# Patient Record
Sex: MALE | Race: WHITE | Employment: STUDENT | URBAN - METROPOLITAN AREA
[De-identification: names, ages, dates, MRNs, and addresses within clinical notes are randomized per-mention and may not be internally consistent; named-entity substitution may affect disease eponyms.]

---

## 2020-09-28 ENCOUNTER — APPOINTMENT (OUTPATIENT)
Dept: LAB | Facility: HOSPITAL | Age: 25
End: 2020-09-28
Attending: INTERNAL MEDICINE
Payer: COMMERCIAL

## 2020-09-28 DIAGNOSIS — R05.8 COUGH WITH EXPOSURE TO COVID-19 VIRUS: ICD-10-CM

## 2020-09-28 DIAGNOSIS — Z20.822 COUGH WITH EXPOSURE TO COVID-19 VIRUS: ICD-10-CM

## 2022-02-11 ENCOUNTER — ORDER TRANSCRIPTION (OUTPATIENT)
Dept: SLEEP CENTER | Age: 27
End: 2022-02-11

## 2022-07-29 ENCOUNTER — OFFICE VISIT (OUTPATIENT)
Dept: FAMILY MEDICINE CLINIC | Facility: CLINIC | Age: 27
End: 2022-07-29
Payer: COMMERCIAL

## 2022-07-29 VITALS
BODY MASS INDEX: 29.83 KG/M2 | OXYGEN SATURATION: 98 % | SYSTOLIC BLOOD PRESSURE: 108 MMHG | DIASTOLIC BLOOD PRESSURE: 59 MMHG | TEMPERATURE: 99 F | WEIGHT: 206 LBS | RESPIRATION RATE: 18 BRPM | HEIGHT: 69.6 IN | HEART RATE: 113 BPM

## 2022-07-29 DIAGNOSIS — J01.90 ACUTE VIRAL SINUSITIS: Primary | ICD-10-CM

## 2022-07-29 DIAGNOSIS — B97.89 ACUTE VIRAL SINUSITIS: Primary | ICD-10-CM

## 2022-07-29 PROCEDURE — 3008F BODY MASS INDEX DOCD: CPT | Performed by: NURSE PRACTITIONER

## 2022-07-29 PROCEDURE — 99202 OFFICE O/P NEW SF 15 MIN: CPT | Performed by: NURSE PRACTITIONER

## 2022-07-29 PROCEDURE — 3078F DIAST BP <80 MM HG: CPT | Performed by: NURSE PRACTITIONER

## 2022-07-29 PROCEDURE — 3074F SYST BP LT 130 MM HG: CPT | Performed by: NURSE PRACTITIONER

## 2022-07-29 RX ORDER — DEXTROAMPHETAMINE SACCHARATE, AMPHETAMINE ASPARTATE, DEXTROAMPHETAMINE SULFATE AND AMPHETAMINE SULFATE 5; 5; 5; 5 MG/1; MG/1; MG/1; MG/1
1 TABLET ORAL 2 TIMES DAILY
COMMUNITY
Start: 2022-07-13

## 2022-07-29 RX ORDER — BUDESONIDE 32 UG/1
2 SPRAY, METERED NASAL DAILY
COMMUNITY
Start: 2022-07-22

## 2022-07-29 RX ORDER — CETIRIZINE HYDROCHLORIDE 10 MG/1
1 TABLET ORAL AS DIRECTED
COMMUNITY

## 2022-07-29 RX ORDER — PREDNISONE 20 MG/1
40 TABLET ORAL DAILY
Qty: 10 TABLET | Refills: 0 | Status: SHIPPED | OUTPATIENT
Start: 2022-07-29 | End: 2022-08-03

## 2022-07-29 RX ORDER — ALPRAZOLAM 1 MG/1
1 TABLET ORAL 3 TIMES DAILY PRN
COMMUNITY
Start: 2022-07-13

## 2022-07-29 RX ORDER — AMOXICILLIN 875 MG/1
875 TABLET, COATED ORAL 2 TIMES DAILY
COMMUNITY
Start: 2022-07-22

## 2022-07-30 LAB — SARS-COV-2 RNA RESP QL NAA+PROBE: NOT DETECTED

## 2023-06-19 ENCOUNTER — HOSPITAL ENCOUNTER (EMERGENCY)
Facility: HOSPITAL | Age: 28
Discharge: HOME | End: 2023-06-19
Attending: EMERGENCY MEDICINE
Payer: COMMERCIAL

## 2023-06-19 ENCOUNTER — TELEPHONE (OUTPATIENT)
Dept: PAIN CLINIC | Facility: CLINIC | Age: 28
End: 2023-06-19

## 2023-06-19 VITALS
TEMPERATURE: 98 F | SYSTOLIC BLOOD PRESSURE: 131 MMHG | RESPIRATION RATE: 18 BRPM | WEIGHT: 205 LBS | DIASTOLIC BLOOD PRESSURE: 74 MMHG | HEART RATE: 80 BPM | BODY MASS INDEX: 31.07 KG/M2 | HEIGHT: 68 IN | OXYGEN SATURATION: 98 %

## 2023-06-19 DIAGNOSIS — M54.16 LUMBAR RADICULOPATHY: Primary | ICD-10-CM

## 2023-06-19 PROCEDURE — 99284 EMERGENCY DEPT VISIT MOD MDM: CPT

## 2023-06-19 PROCEDURE — 99283 EMERGENCY DEPT VISIT LOW MDM: CPT

## 2023-06-19 RX ORDER — CYCLOBENZAPRINE HCL 10 MG
10 TABLET ORAL 3 TIMES DAILY PRN
Qty: 20 TABLET | Refills: 0 | Status: SHIPPED | OUTPATIENT
Start: 2023-06-19 | End: 2023-06-26

## 2023-06-19 RX ORDER — GABAPENTIN 300 MG/1
300 CAPSULE ORAL 3 TIMES DAILY
Qty: 90 CAPSULE | Refills: 0 | Status: SHIPPED | OUTPATIENT
Start: 2023-06-19 | End: 2023-07-19

## 2023-06-19 NOTE — TELEPHONE ENCOUNTER
Received incoming [facility] seen by [facility] for herniated disc of lumbar spine. Referral and records have been placed in RN bin for MD review.

## 2023-06-19 NOTE — ED INITIAL ASSESSMENT (HPI)
Pt reports 8/10 back pain, pt reports he had a MRI a couple of day ago for the back pain and has bulging disc. Pt has an apointment to see a back in month but states the pain is to much.  Pt has cerebral palsy

## 2023-06-21 ENCOUNTER — OFFICE VISIT (OUTPATIENT)
Dept: PAIN CLINIC | Facility: HOSPITAL | Age: 28
End: 2023-06-21
Attending: ANESTHESIOLOGY
Payer: COMMERCIAL

## 2023-06-21 ENCOUNTER — TELEPHONE (OUTPATIENT)
Dept: PAIN CLINIC | Facility: HOSPITAL | Age: 28
End: 2023-06-21

## 2023-06-21 VITALS
HEART RATE: 86 BPM | DIASTOLIC BLOOD PRESSURE: 50 MMHG | RESPIRATION RATE: 18 BRPM | WEIGHT: 206 LBS | BODY MASS INDEX: 31.22 KG/M2 | HEIGHT: 68 IN | SYSTOLIC BLOOD PRESSURE: 95 MMHG

## 2023-06-21 DIAGNOSIS — M47.819 DEGENERATIVE ARTHROPATHY OF SPINAL FACET JOINT: ICD-10-CM

## 2023-06-21 DIAGNOSIS — M48.061 SPINAL STENOSIS OF LUMBAR REGION WITH RADICULOPATHY: ICD-10-CM

## 2023-06-21 DIAGNOSIS — M54.10 BACK PAIN WITH RIGHT-SIDED RADICULOPATHY: Primary | ICD-10-CM

## 2023-06-21 DIAGNOSIS — M51.36 DEGENERATIVE DISC DISEASE, LUMBAR: ICD-10-CM

## 2023-06-21 DIAGNOSIS — M54.16 SPINAL STENOSIS OF LUMBAR REGION WITH RADICULOPATHY: ICD-10-CM

## 2023-06-21 PROCEDURE — 99202 OFFICE O/P NEW SF 15 MIN: CPT

## 2023-06-21 NOTE — TELEPHONE ENCOUNTER
Scheduled procedure for: 6/27/23    Procedure follow-up for: 7/17/23 at 9 am.    Surgery Scheduling Form faxed to [facility] at [phone]

## 2023-06-21 NOTE — PROGRESS NOTES
6/21/2023-presents ambulatory tp CPM accompanied by family; Here to establish care; NEW CONSULT C/O LBP RADIATING DOWN THE RLE, CAUSING NUMBNESS DOWN THE RLE; pt is a golfer, states he woke the am after playing with the lbp, tightness unable to move; has been on gabapentin 300mg and cyclobenzaprine ; today his pain 3/10 but can go as high as an 8;  Referred by his PCP Dr. Albaro Porter; examined by Dr. Silke Guerra; reviewed records and MRI; refer to documentation for the plan of care.

## 2023-06-21 NOTE — TELEPHONE ENCOUNTER
Lumbar COURTNEY L2/3 - Cpt [id number] - Dx M54.16 - APPROVED    [facility] online, request above is authorized.    Authorization # [id number] effective date: 6/21/23 - 7/20/23

## 2023-06-22 ENCOUNTER — OFFICE VISIT (OUTPATIENT)
Dept: PHYSICAL MEDICINE AND REHAB | Facility: CLINIC | Age: 28
End: 2023-06-22
Payer: COMMERCIAL

## 2023-06-22 VITALS
OXYGEN SATURATION: 98 % | BODY MASS INDEX: 31.22 KG/M2 | HEART RATE: 86 BPM | DIASTOLIC BLOOD PRESSURE: 84 MMHG | HEIGHT: 68 IN | SYSTOLIC BLOOD PRESSURE: 120 MMHG | WEIGHT: 206 LBS

## 2023-06-22 DIAGNOSIS — M54.50 ACUTE RIGHT-SIDED LOW BACK PAIN WITHOUT SCIATICA: Primary | ICD-10-CM

## 2023-06-22 PROCEDURE — 3008F BODY MASS INDEX DOCD: CPT | Performed by: PHYSICAL MEDICINE & REHABILITATION

## 2023-06-22 PROCEDURE — 3079F DIAST BP 80-89 MM HG: CPT | Performed by: PHYSICAL MEDICINE & REHABILITATION

## 2023-06-22 PROCEDURE — 3074F SYST BP LT 130 MM HG: CPT | Performed by: PHYSICAL MEDICINE & REHABILITATION

## 2023-06-22 PROCEDURE — 99204 OFFICE O/P NEW MOD 45 MIN: CPT | Performed by: PHYSICAL MEDICINE & REHABILITATION

## 2023-06-22 NOTE — PATIENT INSTRUCTIONS
-Start physical therapy and home exercises  -Gabapentin three times daily  -Muscle relaxer as needed  -Injection as scheduled  -Follow up in 4 weeks

## 2023-06-27 ENCOUNTER — SURGERY CENTER DOCUMENTATION (OUTPATIENT)
Dept: SURGERY | Age: 28
End: 2023-06-27

## 2023-07-06 ENCOUNTER — MED REC SCAN ONLY (OUTPATIENT)
Dept: PHYSICAL MEDICINE AND REHAB | Facility: CLINIC | Age: 28
End: 2023-07-06

## 2023-07-17 ENCOUNTER — OFFICE VISIT (OUTPATIENT)
Dept: PAIN CLINIC | Facility: HOSPITAL | Age: 28
End: 2023-07-17
Attending: ANESTHESIOLOGY
Payer: COMMERCIAL

## 2023-07-17 VITALS — OXYGEN SATURATION: 98 % | HEART RATE: 108 BPM | SYSTOLIC BLOOD PRESSURE: 116 MMHG | DIASTOLIC BLOOD PRESSURE: 81 MMHG

## 2023-07-17 DIAGNOSIS — M47.819 DEGENERATIVE ARTHROPATHY OF SPINAL FACET JOINT: ICD-10-CM

## 2023-07-17 DIAGNOSIS — M54.10 BACK PAIN WITH RIGHT-SIDED RADICULOPATHY: Primary | ICD-10-CM

## 2023-07-17 DIAGNOSIS — M48.061 SPINAL STENOSIS OF LUMBAR REGION WITH RADICULOPATHY: ICD-10-CM

## 2023-07-17 DIAGNOSIS — M51.36 DEGENERATIVE DISC DISEASE, LUMBAR: ICD-10-CM

## 2023-07-17 DIAGNOSIS — M54.16 SPINAL STENOSIS OF LUMBAR REGION WITH RADICULOPATHY: ICD-10-CM

## 2023-07-17 PROCEDURE — 99211 OFF/OP EST MAY X REQ PHY/QHP: CPT

## 2023-07-17 NOTE — PROGRESS NOTES
PT presents ambulatory to the CPM.  Pt reports  he still has some pain in his right hip socket. 0/10 pain right now. Pain increases while sitting on a hard chair and when going from sit to stand. Dr. Demario Zuñiga saw PT for LESI L2/3 F/U. Refer to dictation for POC.

## 2023-07-17 NOTE — CHRONIC PAIN
Follow-up Note    HISTORY OF PRESENT ILLNESS:  Gerald Harp is a 25year old old male, originally referred to the pain clinic by Dr. Andria guajardo. provider found, returns to the clinic forBack pain with right-sided radiculopathy  (primary encounter diagnosis)  Degenerative disc disease, lumbar  Spinal stenosis of lumbar region with radiculopathy  Degenerative arthropathy of spinal facet joint  . Iman Welch returns the pain clinic for follow-up after undergoing a LESI at L2-3 on 6/27/2023 and now reports complete resolution of his pain symptoms. He continues in physical therapy he is stopped all of his pain medications. He is very happy with the outcome as he has returned back to golfing and his daily activities. Pt denies numbness/tingling/weakness. There is no incontinence of bowel/bladder. ALLERGIES:    Seasonal                ITCHING    Comment:Cockroach, trees, grass, dust mites    MEDICATION LIST:  Current Outpatient Medications   Medication Sig Dispense Refill    amphetamine-dextroamphetamine 20 MG Oral Tab Take 1 tablet by mouth 2 (two) times daily. FOR 30 DAYS      [employer] BUDESONIDE 32 MCG/ACT Nasal Suspension 2 sprays by Nasal route daily. ALPRAZolam 1 MG Oral Tab Take 1 tablet (1 mg total) by mouth 3 (three) times daily as needed. cetirizine (ZYRTEC ALLERGY) 10 MG Oral Tab Take 1 tablet (10 mg total) by mouth As Directed. REVIEW OF SYSTEMS:   Bowel/Bladder Incontinence: none  Coughing/sneezing/straining does not exacerbate the pain.   Numbness/tingling: as above  Weakness: as above  Weight Loss: Negative   Fever: Negative   Cardiovascular:  No current chest pain or palpitations   Respiratory:  No current shortness of breath   Gastrointestinal:  No active ulcer  Genitourinary:  Negative  Integumentary :  Negative  Psychiatric:  Negative  Hematologic: No active bleeding  Lymphatic: No current lymphedema  Allergic/Immunologic:  Negative  Musculoskeletal: As above  Neurological: As above  Denies chest pain, shortness of breath. MEDICAL HISTORY:  Patient Active Problem List:     Allergic rhinitis due to other allergen     Other chronic sinusitis     Hypertrophy of adenoids alone     DNS (deviated nasal septum)     Allergic rhinitis due to pollen    Past Medical History:   Diagnosis Date    Back pain     ANA (generalized anxiety disorder)        SURGICAL HISTORY:  No past surgical history on file. FAMILY HISTORY:  No family history on file. SOCIAL HISTORY:  Social History    Socioeconomic History      Marital status: Single      Spouse name: Not on file      Number of children: Not on file      Years of education: Not on file      Highest education level: Not on file    Occupational History      Not on file    Tobacco Use      Smoking status: Never      Smokeless tobacco: Never    Substance and Sexual Activity      Alcohol use: Yes        Comment: Weekend 1-2      Drug use: Never      Sexual activity: Not on file    Other Topics      Concerns:        Caffeine Concern: Yes          1 cup coffee daily        Exercise: Yes          Golfer        Seat Belt: Not Asked        Special Diet: Not Asked        Stress Concern: Not Asked        Weight Concern: Not Asked    Social History Narrative      The patient does not use an assistive device. .        The patient does live in a home with stairs.     Social Determinants of Health  Financial Resource Strain: Not on file  Food Insecurity: Not on file  Transportation Needs: Not on file  Physical Activity: Not on file  Stress: Not on file  Social Connections: Not on file  Housing Stability: Not on file  PHYSICAL EXAMINATION:   07/17/23  0856   BP: 116/81   Pulse: 108      General: Alert and oriented x3  Affect:  NAD  Eyes: anicteric; no injection  Gait: Normal;  cane user - No  Spine: Normal      IMAGING:  No new relevant studies     [place] PHYSICIAN MONITORING PROGRAM REVIEWED  No    ASSESSMENT:   Radha Flores is a 25year old  male, with Back pain with right-sided radiculopathy  (primary encounter diagnosis)  Degenerative disc disease, lumbar  Spinal stenosis of lumbar region with radiculopathy  Degenerative arthropathy of spinal facet joint       PLAN:  RECOMMENDATIONS:  1) continue physical therapy and if pain returns he may follow back up with the pain service. Comprehensive analgesic plan was formulated. Conservative vs. Aggressive measures were discussed at length including pharmacotherapy (eg. Anti- inflammatories, muscle relaxants, neuropathic medications, oral steroids, analgesics), injections, and further testing. Risks and benefits of all options were discussed at length to patients satisfaction during a comprehensive interactive discussion. All questions were answered during extended questions and answer session. Patient agreeable to discussion plan. Greater than 50% of the time was spent with counseling (nature of discussion centered around pain, therapy, and treatment options), face to face time, time spent reviewing data, obtaining patient information and discussing the care with the patients health care providers.      Pt will return to clinic as needed  Total time: 18 minutes    Aubree Ritchie MD  7/17/2023  Anesthesia Chronic Pain Service

## 2023-07-20 ENCOUNTER — MED REC SCAN ONLY (OUTPATIENT)
Dept: PAIN CLINIC | Facility: CLINIC | Age: 28
End: 2023-07-20

## 2023-07-31 ENCOUNTER — OFFICE VISIT (OUTPATIENT)
Dept: PHYSICAL MEDICINE AND REHAB | Facility: CLINIC | Age: 28
End: 2023-07-31
Payer: COMMERCIAL

## 2023-07-31 VITALS
DIASTOLIC BLOOD PRESSURE: 84 MMHG | SYSTOLIC BLOOD PRESSURE: 128 MMHG | WEIGHT: 206 LBS | BODY MASS INDEX: 31.22 KG/M2 | HEIGHT: 68 IN

## 2023-07-31 DIAGNOSIS — M54.16 RIGHT LUMBAR RADICULOPATHY: Primary | ICD-10-CM

## 2023-07-31 PROCEDURE — 99214 OFFICE O/P EST MOD 30 MIN: CPT | Performed by: PHYSICAL MEDICINE & REHABILITATION

## 2023-07-31 PROCEDURE — 3074F SYST BP LT 130 MM HG: CPT | Performed by: PHYSICAL MEDICINE & REHABILITATION

## 2023-07-31 PROCEDURE — 3008F BODY MASS INDEX DOCD: CPT | Performed by: PHYSICAL MEDICINE & REHABILITATION

## 2023-07-31 PROCEDURE — 3079F DIAST BP 80-89 MM HG: CPT | Performed by: PHYSICAL MEDICINE & REHABILITATION

## 2023-07-31 RX ORDER — TRAMADOL HYDROCHLORIDE 50 MG/1
50 TABLET ORAL EVERY 6 HOURS PRN
COMMUNITY
Start: 2023-06-16 | End: 2023-07-31 | Stop reason: ALTCHOICE

## 2023-08-01 ENCOUNTER — TELEPHONE (OUTPATIENT)
Dept: PHYSICAL MEDICINE AND REHAB | Facility: CLINIC | Age: 28
End: 2023-08-01

## 2023-08-01 NOTE — TELEPHONE ENCOUNTER
Provider Comments 07/31/2023  1:37 PM Yamileth Alberts DO Provider Comments -   Note:  Right L1 and L2 Transforaminal Epidural Steroid Injection under fluoroscopy guidance under local anesthesia  Referral Information    Referral # Creation Date Referral Status Status Update    [id number] 07/31/2023 Open 07/31/2023: Status History     Patient has been notified of status of injection.     Saved date on 8/7/23

## 2023-08-01 NOTE — TELEPHONE ENCOUNTER
Initiated authorization for Right L1 and L2 Transforaminal Epidural Steroid Injection under fluoroscopy CPT 13432, 11803 dx:M54.16 to be done at [facility] with [facility]  Status: Approved w/ order #[id number] valid 8/7/23-9/5/23

## 2023-08-01 NOTE — TELEPHONE ENCOUNTER
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

## 2023-08-02 NOTE — TELEPHONE ENCOUNTER
Patient has been scheduled for Right L1 and L2 Transforaminal Epidural Steroid Injection under fluoroscopy  on 8/7/23 at the [facility] with Dr. Sybil Hughes.   -Anesthesia type: Local.  -Patient informed to fast 12 hours prior to procedure with IVCS/MAC.   -Scheduling [address] covid testing required for all procedures whether patient is vaccinated or not. -Patient was advised that if he/she does receive the covid vaccine it needs to be at least 2 weeks before or after the injection. -Medications and allergies reviewed. -Patient reminded to hold NSAIDs (Ibuprofen, ASA 81, Aleve, Naproxen, Mobic, Diclofenac, Etodolac, Celebrex etc.) for 3 days prior to Lumbar MBB/Facet if BMI is greater than 35. For Cervical injections only hold multivitamins, Vitamin E, Fish Oil, Phentermine/Lomaira for 7 days prior to injection and NSAIDS.  mg to be held for 7 days prior to injections.  -If patient is receiving MAC/IVCS Phentermine Nohemy Isamar) will need to be held for 7 days prior to injection.  -If on blood thinner clearance has been received to hold this medication by provider.   -Patient informed he/she will need a  to and from procedure. Nathaly Iqbal is located in the [facility] [address] 1st floor,  may park in the yellow/purple parking lot. Patient verbalized understanding and agrees with plan.  -----> Scheduled in Epic: Yes  -----> Scheduled in Surgical Case:  Yes

## 2023-08-07 ENCOUNTER — OFFICE VISIT (OUTPATIENT)
Dept: SURGERY | Facility: CLINIC | Age: 28
End: 2023-08-07
Payer: COMMERCIAL

## 2023-08-07 DIAGNOSIS — M54.16 RIGHT LUMBAR RADICULOPATHY: Primary | ICD-10-CM

## 2023-08-07 NOTE — PROCEDURES
[address]    2-LEVEL LUMBAR TRANSFORAMINAL   NAME:  Alanna Braxton    MR #:    [id number] :  1995     PHYSICIAN:  Tone Coiln. Jeff Mckeon, DO        Operative Report    DATE OF PROCEDURE: 2023   PREOPERATIVE DIAGNOSES: 1. Right lumbar radiculopathy        POSTOPERATIVE DIAGNOSES:   1. Right lumbar radiculopathy        PROCEDURES: Right L1 and L2 transforaminal epidural steroid injections done under fluoroscopic guidance with contrast enhancement. SURGEON: Tone Colin. Jeff File, DO   ANESTHESIA: Local   INDICATIONS:      OPERATIVE PROCEDURE:  Written consent was obtained from the patient. The patient was brought into the operating room and placed in the prone position on the fluoroscopy table with pillow underneath the abdomen. The patient's skin was cleaned and draped in a normal sterile fashion. Using AP fluoroscopy, all five lumbar vertebrae were identified. When the first and second vertebrae were identified, fluoroscopy was left anterior obliqued opening up the right L1-2 and right L2-3 intervertebral foramen. At this point in time, each site was anesthetized with 1% PF lidocaine without epinephrine. Then, 3.5 inch, 22-gauge spinal needles were inserted and directed towards the right L1-2 and right L2-3 intervertebral foramen. When they felt to be in good position, AP fluoroscopy was used to advance the needles to the 6 o'clock position on the right L1 and right L2 pedicles. At this point in time, Omnipaque-240 contrast was used to obtain a good epidurogram indicating correct needle placement at each level. Then, aspiration was performed. No blood, fluid, or air was aspirated. Then, the patient was injected with a 3 cc solution of 1cc of 10 mg/cc of Dexamethasone and 2 cc of 1% PF lidocaine without epinephrine at each site. After this, the needles were removed. Each site was cleaned. Band-Aids were applied. The patient was transferred to the cart and into [facility].   The patient was given discharge instructions and will follow up in the clinic as scheduled. Throughout the whole procedure, the patient's pulse oximetry and vital signs were monitored and they remained completely stable. Also, throughout the whole procedure, prior to injection of any medication, aspiration was performed. No blood, fluid, or air was aspirated at anytime.     Janeth Duque DO, FAAPMR & CAQSM  Physical Medicine and Rehabilitation/Sports Medicine  MEDICAL CENTER [facility]

## 2023-08-14 ENCOUNTER — TELEPHONE (OUTPATIENT)
Dept: PHYSICAL MEDICINE AND REHAB | Facility: CLINIC | Age: 28
End: 2023-08-14

## 2023-08-14 NOTE — TELEPHONE ENCOUNTER
Condition update after Procedure. - Patient had Right L1 and L2 transforaminal epidural steroid injections  (specific procedure) on 08/07/2023 (date) with . - 0% relief. If pain is worse:  - Pain level prior to procedure:   7  - Current pain level:  7  - Pain location:  low back pain traveling down to right hip.   - Pain description: sharp/stabbing, continuous worst with movement. - Current pain treatment:  Motrin as needed (little help). - LOV: 7/31/2023 Shiraz Paul DO    - NOV: Visit date not found   - Plan from LOV: injection         Pt asking advise on what to do for the pain. Patient used to take gabapentin 300 mg x3 daily and stopped, asking if he can restart it. Pt asking if he should still do PT as he feels severe pain while PT. Pt wanted to ask what Dr Michael Bond recommends prior to scheduling a follow up.

## 2023-08-15 NOTE — TELEPHONE ENCOUNTER
Spoke with pt and educated on Dr Peyton Arteaga recommendations to wait up to two weeks for the full effect of the injection. Pt verbalized understanding. No further actions needed at this time. Pt stated he will call after two weeks. Closing the encounter.

## 2023-09-14 ENCOUNTER — PATIENT MESSAGE (OUTPATIENT)
Dept: PHYSICAL MEDICINE AND REHAB | Facility: CLINIC | Age: 28
End: 2023-09-14

## 2023-10-10 ENCOUNTER — APPOINTMENT (OUTPATIENT)
Dept: GENERAL RADIOLOGY | Age: 28
End: 2023-10-10
Attending: ORTHOPAEDIC SURGERY

## 2023-10-17 ENCOUNTER — HOSPITAL ENCOUNTER (OUTPATIENT)
Dept: GENERAL RADIOLOGY | Age: 28
Discharge: HOME | End: 2023-10-17
Attending: ORTHOPAEDIC SURGERY

## 2023-10-17 DIAGNOSIS — M54.16 LUMBAR RADICULOPATHY: ICD-10-CM

## 2023-10-17 PROCEDURE — 10002800 HB RX 250 W HCPCS: Performed by: SPECIALIST

## 2023-10-17 PROCEDURE — 10002805 HB CONTRAST AGENT: Performed by: SPECIALIST

## 2023-10-17 PROCEDURE — 64483 NJX AA&/STRD TFRM EPI L/S 1: CPT

## 2023-10-17 RX ORDER — TRIAMCINOLONE ACETONIDE 40 MG/ML
80 INJECTION, SUSPENSION INTRA-ARTICULAR; INTRAMUSCULAR ONCE
Status: COMPLETED | OUTPATIENT
Start: 2023-10-17 | End: 2023-10-17

## 2023-10-17 RX ADMIN — IOHEXOL 10 ML: 300 INJECTION, SOLUTION INTRAVENOUS at 13:37

## 2023-10-17 RX ADMIN — TRIAMCINOLONE ACETONIDE 80 MG: 40 INJECTION, SUSPENSION INTRA-ARTICULAR; INTRAMUSCULAR at 13:36

## 2024-11-10 ENCOUNTER — HOSPITAL ENCOUNTER (EMERGENCY)
Facility: HOSPITAL | Age: 29
Discharge: HOME | End: 2024-11-10
Attending: EMERGENCY MEDICINE
Payer: COMMERCIAL

## 2024-11-10 VITALS
HEIGHT: 68 IN | HEART RATE: 96 BPM | RESPIRATION RATE: 18 BRPM | WEIGHT: 205 LBS | BODY MASS INDEX: 31.07 KG/M2 | DIASTOLIC BLOOD PRESSURE: 84 MMHG | TEMPERATURE: 97 F | SYSTOLIC BLOOD PRESSURE: 131 MMHG | OXYGEN SATURATION: 96 %

## 2024-11-10 DIAGNOSIS — M62.838 SPASM OF MUSCLE: Primary | ICD-10-CM

## 2024-11-10 PROCEDURE — 96372 THER/PROPH/DIAG INJ SC/IM: CPT

## 2024-11-10 PROCEDURE — 99283 EMERGENCY DEPT VISIT LOW MDM: CPT

## 2024-11-10 RX ORDER — DIAZEPAM 5 MG/1
5 TABLET ORAL 3 TIMES DAILY PRN
Qty: 15 TABLET | Refills: 0 | Status: SHIPPED | OUTPATIENT
Start: 2024-11-10 | End: 2024-11-17

## 2024-11-10 RX ORDER — KETOROLAC TROMETHAMINE 30 MG/ML
60 INJECTION, SOLUTION INTRAMUSCULAR; INTRAVENOUS ONCE
Status: COMPLETED | OUTPATIENT
Start: 2024-11-10 | End: 2024-11-10

## 2024-11-10 NOTE — ED QUICK NOTES
Pt cleared by MD for discharge. Belongings with patient. Patient instructions reviewed . Pt A&ox4. Pt walking with steady gait.

## 2024-11-10 NOTE — ED INITIAL ASSESSMENT (HPI)
Pt c/o pain in right shoulder, pain with turning head. Had massage yesterday which helped. Tingling in arm- hx neuropathy.       Hx Spinal Fusion Nov 23.

## 2024-11-10 NOTE — ED PROVIDER NOTES
Patient Seen in: [facility] Emergency Department      History     Chief Complaint   Patient presents with    Pain     Stated Complaint: Chronic Shoulder/Neck Pain; Neuropathy    Subjective:   HPI      Patient is a 29-year-old male with a history of CP neuropathy muscle spasm and spasticity who complains of pain and tightness to his right trapezius area.  He states yesterday he was in [place] had a massage which helped him a great deal but shortly thereafter and then worsening this morning that pain and spasms returned.  He states at times it radiates down his right arm.  He has some intermittent tingling to his right third finger.  No new injury or fall.    Patient states he has a prescription for Flexeril that makes him too somnolent he does not like to take it    Objective:     Past Medical History:    Anxiety    Back pain    ANA (generalized anxiety disorder)              History reviewed. No pertinent surgical history.             Social History     Socioeconomic History    Marital status: Single   Tobacco Use    Smoking status: Never    Smokeless tobacco: Never   Substance and Sexual Activity    Alcohol use: Yes     Comment: Occasional Beer once a week    Drug use: Never   Other Topics Concern    Caffeine Concern Yes     Comment: 1 cup coffee daily    Exercise Yes     Comment: Iman   Social History Narrative    The patient does not use an assistive device..      The patient does live in a home with stairs.     Social Drivers of Health      Received from [facility]    Housing Stability                  Physical Exam     ED Triage Vitals [11/10/24 1527]   /89   Pulse 87   Resp 18   Temp 97.1 °F (36.2 °C)   Temp src    SpO2 100 %   O2 Device        Current Vitals:   Vital Signs  BP: 153/89  Pulse: 87  Resp: 18  Temp: 97.1 °F (36.2 °C)    Oxygen Therapy  SpO2: 100 %        Physical Exam  Constitutional: Oriented to person, place, and time. Appears well-developed and  well-nourished.   HEENT: PERRLA  Head: Normocephalic and atraumatic.   Right Ear: External ear normal.   Left Ear: External ear normal.   Nose: Nose normal.   Mouth/Throat: Oropharynx is clear and moist.   Eyes: Conjunctivae and EOM are normal. Pupils are equal, round, and reactive to light.   Neck: Right trapezius tenderness and muscle spasm  Cardiovascular: Normal rate, regular rhythm, normal heart sounds and intact distal pulses.    Pulmonary/Chest: Effort normal and breath sounds normal. No respiratory distress.   Abdominal: Soft. Bowel sounds are normal. Exhibits no distension and no mass. There is no tenderness. There is no rebound and no guarding.   Musculoskeletal: Normal range of motion. Exhibits no edema or tenderness.   Lymphadenopathy: No cervical adenopathy.   Neurological: Alert and oriented to person, place, and time. Normal reflexes. No cranial nerve deficit. No motor os sensory defecits noted Coordination normal.   Skin: Skin is warm and dry.   Psychiatric: Normal mood and affect. Behavior is normal. Judgment and thought content normal.   Nursing note and vitals reviewed.      ED Course   Labs Reviewed - No data to display                MDM      Use of independent historian:     I personally reviewed and interpreted the images :     No results found.    Vitals:    11/10/24 1527   BP: 153/89   Pulse: 87   Resp: 18   Temp: 97.1 °F (36.2 °C)   SpO2: 100%   Weight: 93 kg   Height: 172.7 cm (5' 8\")     *I personally reviewed and interpreted all ED vitals.    Pulse Ox: 100%, Room air, Normal       Differential Diagnosis/ Diagnostic Considerations: 29-year-old male history of CP and spasticity now with muscle spasms right trapezius area worsened after massage yesterday.  Will give Toradol and prescribe low-dose Valium as muscle relaxant recommend heat.  No neurologic deficits appreciated on exam    Medical Record Review: I personally reviewed available prior medical records for any recent pertinent  discharge summaries, testing, and procedures and reviewed those reports and found evaluation with [facility] 2/27/2024 history of lumbar fusion notes reviewed.    Complicating Factors: The patient already has CP spasticity lumbar which contribute to the complexity of this ED evaluation.    Social determinants of health:    Prescription drug management:      Shared Decision Making:    ED Course: Plans patient did receive Toradol with some relief.  Will discharge home with short course of Valium he will hold his Xanax    Discussion of management with other healthcare providers:    Condition upon leaving the department: Stable          Medical Decision Making      Disposition and Plan     Clinical Impression:  1. Spasm of muscle         Disposition:  Discharge  11/10/2024  4:31 pm    Follow-up:  Lea Dorantes DO  [facility] HAVE[address] [id number] [phone]    Follow up in 2 day(s)            Medications Prescribed:  Current Discharge Medication List        START taking these medications    Details   diazePAM 5 MG Oral Tab Take 1 tablet (5 mg total) by mouth 3 (three) times daily as needed (muscle spasm).  Qty: 15 tablet, Refills: 0    Associated Diagnoses: Spasm of muscle                 Supplementary Documentation:

## 2024-12-17 ENCOUNTER — EKG ENCOUNTER (OUTPATIENT)
Dept: LAB | Facility: HOSPITAL | Age: 29
End: 2024-12-17
Attending: INTERNAL MEDICINE
Payer: COMMERCIAL

## 2024-12-17 ENCOUNTER — LAB ENCOUNTER (OUTPATIENT)
Dept: LAB | Facility: REFERENCE LAB | Age: 29
End: 2024-12-17
Attending: INTERNAL MEDICINE
Payer: COMMERCIAL

## 2024-12-17 DIAGNOSIS — Z01.818 PREOP EXAMINATION: ICD-10-CM

## 2024-12-17 DIAGNOSIS — M51.369 DEGENERATIVE LUMBAR DISC: ICD-10-CM

## 2024-12-17 DIAGNOSIS — Z01.818 PREOP EXAMINATION: Primary | ICD-10-CM

## 2024-12-17 LAB
ALBUMIN SERPL-MCNC: 4.9 G/DL (ref 3.2–4.8)
ALBUMIN/GLOB SERPL: 1.8 {RATIO} (ref 1–2)
ALP LIVER SERPL-CCNC: 74 U/L
ALT SERPL-CCNC: 38 U/L
ANION GAP SERPL CALC-SCNC: 8 MMOL/L (ref 0–18)
AST SERPL-CCNC: 28 U/L (ref ?–34)
ATRIAL RATE: 103 BPM
BASOPHILS # BLD AUTO: 0.06 X10(3) UL (ref 0–0.2)
BASOPHILS NFR BLD AUTO: 0.7 %
BILIRUB SERPL-MCNC: 0.5 MG/DL (ref 0.3–1.2)
BUN BLD-MCNC: 14 MG/DL (ref 9–23)
BUN/CREAT SERPL: 12.7 (ref 10–20)
CALCIUM BLD-MCNC: 10 MG/DL (ref 8.7–10.4)
CHLORIDE SERPL-SCNC: 102 MMOL/L (ref 98–112)
CO2 SERPL-SCNC: 29 MMOL/L (ref 21–32)
CREAT BLD-MCNC: 1.1 MG/DL
DEPRECATED RDW RBC AUTO: 41.5 FL (ref 35.1–46.3)
EGFRCR SERPLBLD CKD-EPI 2021: 92 ML/MIN/1.73M2 (ref 60–?)
EOSINOPHIL # BLD AUTO: 0.21 X10(3) UL (ref 0–0.7)
EOSINOPHIL NFR BLD AUTO: 2.5 %
ERYTHROCYTE [DISTWIDTH] IN BLOOD BY AUTOMATED COUNT: 12.1 % (ref 11–15)
FASTING STATUS PATIENT QL REPORTED: YES
GLOBULIN PLAS-MCNC: 2.7 G/DL (ref 2–3.5)
GLUCOSE BLD-MCNC: 115 MG/DL (ref 70–99)
HCT VFR BLD AUTO: 44.3 %
HGB BLD-MCNC: 15.2 G/DL
IMM GRANULOCYTES # BLD AUTO: 0.02 X10(3) UL (ref 0–1)
IMM GRANULOCYTES NFR BLD: 0.2 %
LYMPHOCYTES # BLD AUTO: 1.77 X10(3) UL (ref 1–4)
LYMPHOCYTES NFR BLD AUTO: 20.8 %
MCH RBC QN AUTO: 31.9 PG (ref 26–34)
MCHC RBC AUTO-ENTMCNC: 34.3 G/DL (ref 31–37)
MCV RBC AUTO: 93.1 FL
MONOCYTES # BLD AUTO: 0.78 X10(3) UL (ref 0.1–1)
MONOCYTES NFR BLD AUTO: 9.2 %
NEUTROPHILS # BLD AUTO: 5.68 X10 (3) UL (ref 1.5–7.7)
NEUTROPHILS # BLD AUTO: 5.68 X10(3) UL (ref 1.5–7.7)
NEUTROPHILS NFR BLD AUTO: 66.6 %
OSMOLALITY SERPL CALC.SUM OF ELEC: 289 MOSM/KG (ref 275–295)
P AXIS: 67 DEGREES
P-R INTERVAL: 142 MS
PLATELET # BLD AUTO: 259 10(3)UL (ref 150–450)
POTASSIUM SERPL-SCNC: 4.5 MMOL/L (ref 3.5–5.1)
PROT SERPL-MCNC: 7.6 G/DL (ref 5.7–8.2)
Q-T INTERVAL: 324 MS
QRS DURATION: 90 MS
QTC CALCULATION (BEZET): 424 MS
R AXIS: 13 DEGREES
RBC # BLD AUTO: 4.76 X10(6)UL
SODIUM SERPL-SCNC: 139 MMOL/L (ref 136–145)
T AXIS: 9 DEGREES
VENTRICULAR RATE: 103 BPM
WBC # BLD AUTO: 8.5 X10(3) UL (ref 4–11)

## 2024-12-17 PROCEDURE — 36415 COLL VENOUS BLD VENIPUNCTURE: CPT

## 2024-12-17 PROCEDURE — 93010 ELECTROCARDIOGRAM REPORT: CPT | Performed by: INTERNAL MEDICINE

## 2024-12-17 PROCEDURE — 80053 COMPREHEN METABOLIC PANEL: CPT

## 2024-12-17 PROCEDURE — 93005 ELECTROCARDIOGRAM TRACING: CPT

## 2024-12-17 PROCEDURE — 85025 COMPLETE CBC W/AUTO DIFF WBC: CPT

## 2024-12-30 VITALS — HEIGHT: 68 IN | BODY MASS INDEX: 30.31 KG/M2 | WEIGHT: 200 LBS

## 2024-12-30 RX ORDER — SODIUM CHLORIDE, SODIUM LACTATE, POTASSIUM CHLORIDE, CALCIUM CHLORIDE 600; 310; 30; 20 MG/100ML; MG/100ML; MG/100ML; MG/100ML
INJECTION, SOLUTION INTRAVENOUS CONTINUOUS
Status: CANCELLED | OUTPATIENT
Start: 2024-12-30

## 2024-12-30 RX ORDER — MULTIVITAMIN
1 TABLET ORAL DAILY
COMMUNITY
End: 2025-01-07

## 2024-12-30 RX ORDER — METOCLOPRAMIDE HYDROCHLORIDE 5 MG/ML
10 INJECTION INTRAMUSCULAR; INTRAVENOUS ONCE
Status: CANCELLED | OUTPATIENT
Start: 2024-12-30

## 2024-12-30 RX ORDER — FAMOTIDINE 10 MG/ML
20 INJECTION, SOLUTION INTRAVENOUS ONCE
Status: CANCELLED | OUTPATIENT
Start: 2024-12-30

## 2024-12-30 RX ORDER — FAMOTIDINE 20 MG/1
20 TABLET, FILM COATED ORAL ONCE
Status: CANCELLED | OUTPATIENT
Start: 2024-12-30 | End: 2024-12-30

## 2024-12-30 RX ORDER — CYCLOBENZAPRINE HCL 10 MG
10 TABLET ORAL 3 TIMES DAILY
COMMUNITY

## 2024-12-30 RX ORDER — METOCLOPRAMIDE 10 MG/1
10 TABLET ORAL ONCE
Status: CANCELLED | OUTPATIENT
Start: 2024-12-30 | End: 2024-12-30

## 2025-01-07 ENCOUNTER — HOSPITAL ENCOUNTER (OUTPATIENT)
Dept: MRI IMAGING | Facility: HOSPITAL | Age: 30
Discharge: HOME | End: 2025-01-07
Attending: ORTHOPAEDIC SURGERY
Payer: COMMERCIAL

## 2025-01-07 ENCOUNTER — ANESTHESIA EVENT (OUTPATIENT)
Dept: ADMINISTRATIVE | Facility: HOSPITAL | Age: 30
End: 2025-01-07
Payer: COMMERCIAL

## 2025-01-07 ENCOUNTER — HOSPITAL ENCOUNTER (OUTPATIENT)
Dept: MRI IMAGING | Facility: HOSPITAL | Age: 30
Discharge: HOME | End: 2025-01-07
Attending: NURSE PRACTITIONER
Payer: COMMERCIAL

## 2025-01-07 ENCOUNTER — HOSPITAL ENCOUNTER (OUTPATIENT)
Dept: MRI IMAGING | Facility: HOSPITAL | Age: 30
End: 2025-01-07
Attending: NURSE PRACTITIONER
Payer: COMMERCIAL

## 2025-01-07 ENCOUNTER — ANESTHESIA (OUTPATIENT)
Dept: MRI IMAGING | Facility: HOSPITAL | Age: 30
End: 2025-01-07

## 2025-01-07 ENCOUNTER — ANESTHESIA (OUTPATIENT)
Dept: ADMINISTRATIVE | Facility: HOSPITAL | Age: 30
End: 2025-01-07
Payer: COMMERCIAL

## 2025-01-07 ENCOUNTER — ANESTHESIA EVENT (OUTPATIENT)
Dept: MRI IMAGING | Facility: HOSPITAL | Age: 30
End: 2025-01-07

## 2025-01-07 VITALS
RESPIRATION RATE: 16 BRPM | BODY MASS INDEX: 32.49 KG/M2 | WEIGHT: 207 LBS | SYSTOLIC BLOOD PRESSURE: 140 MMHG | DIASTOLIC BLOOD PRESSURE: 96 MMHG | HEIGHT: 67 IN | TEMPERATURE: 98 F | OXYGEN SATURATION: 94 % | HEART RATE: 68 BPM

## 2025-01-07 DIAGNOSIS — M54.12 CERVICAL RADICULOPATHY: ICD-10-CM

## 2025-01-07 PROCEDURE — BR30ZZZ MAGNETIC RESONANCE IMAGING (MRI) OF CERVICAL SPINE: ICD-10-PCS | Performed by: NURSE PRACTITIONER

## 2025-01-07 PROCEDURE — 72141 MRI NECK SPINE W/O DYE: CPT | Performed by: NURSE PRACTITIONER

## 2025-01-07 RX ORDER — MIDAZOLAM HYDROCHLORIDE 1 MG/ML
INJECTION INTRAMUSCULAR; INTRAVENOUS AS NEEDED
Status: DISCONTINUED | OUTPATIENT
Start: 2025-01-07 | End: 2025-01-07 | Stop reason: SURG

## 2025-01-07 RX ORDER — HYDROMORPHONE HYDROCHLORIDE 1 MG/ML
0.6 INJECTION, SOLUTION INTRAMUSCULAR; INTRAVENOUS; SUBCUTANEOUS EVERY 5 MIN PRN
Status: DISCONTINUED | OUTPATIENT
Start: 2025-01-07 | End: 2025-01-09

## 2025-01-07 RX ORDER — NALOXONE HYDROCHLORIDE 0.4 MG/ML
0.08 INJECTION, SOLUTION INTRAMUSCULAR; INTRAVENOUS; SUBCUTANEOUS AS NEEDED
Status: ACTIVE | OUTPATIENT
Start: 2025-01-07 | End: 2025-01-07

## 2025-01-07 RX ORDER — HYDROMORPHONE HYDROCHLORIDE 1 MG/ML
0.2 INJECTION, SOLUTION INTRAMUSCULAR; INTRAVENOUS; SUBCUTANEOUS EVERY 5 MIN PRN
Status: DISCONTINUED | OUTPATIENT
Start: 2025-01-07 | End: 2025-01-09

## 2025-01-07 RX ORDER — MORPHINE SULFATE 4 MG/ML
4 INJECTION, SOLUTION INTRAMUSCULAR; INTRAVENOUS EVERY 10 MIN PRN
Status: DISCONTINUED | OUTPATIENT
Start: 2025-01-07 | End: 2025-01-09

## 2025-01-07 RX ORDER — MORPHINE SULFATE 10 MG/ML
6 INJECTION, SOLUTION INTRAMUSCULAR; INTRAVENOUS EVERY 10 MIN PRN
Status: DISCONTINUED | OUTPATIENT
Start: 2025-01-07 | End: 2025-01-09

## 2025-01-07 RX ORDER — DEXAMETHASONE SODIUM PHOSPHATE 4 MG/ML
VIAL (ML) INJECTION AS NEEDED
Status: DISCONTINUED | OUTPATIENT
Start: 2025-01-07 | End: 2025-01-07 | Stop reason: SURG

## 2025-01-07 RX ORDER — METOCLOPRAMIDE HYDROCHLORIDE 5 MG/ML
10 INJECTION INTRAMUSCULAR; INTRAVENOUS ONCE
Status: COMPLETED | OUTPATIENT
Start: 2025-01-07 | End: 2025-01-07

## 2025-01-07 RX ORDER — FAMOTIDINE 20 MG/1
20 TABLET, FILM COATED ORAL ONCE
Status: COMPLETED | OUTPATIENT
Start: 2025-01-07 | End: 2025-01-07

## 2025-01-07 RX ORDER — SODIUM CHLORIDE, SODIUM LACTATE, POTASSIUM CHLORIDE, CALCIUM CHLORIDE 600; 310; 30; 20 MG/100ML; MG/100ML; MG/100ML; MG/100ML
INJECTION, SOLUTION INTRAVENOUS CONTINUOUS
Status: DISCONTINUED | OUTPATIENT
Start: 2025-01-07 | End: 2025-01-09

## 2025-01-07 RX ORDER — ONDANSETRON 2 MG/ML
4 INJECTION INTRAMUSCULAR; INTRAVENOUS EVERY 6 HOURS PRN
Status: DISCONTINUED | OUTPATIENT
Start: 2025-01-07 | End: 2025-01-09

## 2025-01-07 RX ORDER — LIDOCAINE HYDROCHLORIDE 10 MG/ML
INJECTION, SOLUTION EPIDURAL; INFILTRATION; INTRACAUDAL; PERINEURAL AS NEEDED
Status: DISCONTINUED | OUTPATIENT
Start: 2025-01-07 | End: 2025-01-07 | Stop reason: SURG

## 2025-01-07 RX ORDER — METOCLOPRAMIDE 10 MG/1
10 TABLET ORAL ONCE
Status: COMPLETED | OUTPATIENT
Start: 2025-01-07 | End: 2025-01-07

## 2025-01-07 RX ORDER — FAMOTIDINE 10 MG/ML
20 INJECTION, SOLUTION INTRAVENOUS ONCE
Status: COMPLETED | OUTPATIENT
Start: 2025-01-07 | End: 2025-01-07

## 2025-01-07 RX ORDER — ONDANSETRON 2 MG/ML
INJECTION INTRAMUSCULAR; INTRAVENOUS AS NEEDED
Status: DISCONTINUED | OUTPATIENT
Start: 2025-01-07 | End: 2025-01-07 | Stop reason: SURG

## 2025-01-07 RX ORDER — PROCHLORPERAZINE EDISYLATE 5 MG/ML
5 INJECTION INTRAMUSCULAR; INTRAVENOUS EVERY 8 HOURS PRN
Status: DISCONTINUED | OUTPATIENT
Start: 2025-01-07 | End: 2025-01-09

## 2025-01-07 RX ORDER — HYDROMORPHONE HYDROCHLORIDE 1 MG/ML
0.4 INJECTION, SOLUTION INTRAMUSCULAR; INTRAVENOUS; SUBCUTANEOUS EVERY 5 MIN PRN
Status: DISCONTINUED | OUTPATIENT
Start: 2025-01-07 | End: 2025-01-09

## 2025-01-07 RX ORDER — MORPHINE SULFATE 4 MG/ML
2 INJECTION, SOLUTION INTRAMUSCULAR; INTRAVENOUS EVERY 10 MIN PRN
Status: DISCONTINUED | OUTPATIENT
Start: 2025-01-07 | End: 2025-01-09

## 2025-01-07 RX ORDER — MIDAZOLAM HYDROCHLORIDE 1 MG/ML
INJECTION INTRAMUSCULAR; INTRAVENOUS
Status: DISCONTINUED
Start: 2025-01-07 | End: 2025-01-08

## 2025-01-07 RX ADMIN — LIDOCAINE HYDROCHLORIDE 50 MG: 10 INJECTION, SOLUTION EPIDURAL; INFILTRATION; INTRACAUDAL; PERINEURAL at 14:59:00

## 2025-01-07 RX ADMIN — ONDANSETRON 4 MG: 2 INJECTION INTRAMUSCULAR; INTRAVENOUS at 15:04:00

## 2025-01-07 RX ADMIN — MIDAZOLAM HYDROCHLORIDE 2 MG: 1 INJECTION INTRAMUSCULAR; INTRAVENOUS at 14:45:00

## 2025-01-07 RX ADMIN — SODIUM CHLORIDE, SODIUM LACTATE, POTASSIUM CHLORIDE, CALCIUM CHLORIDE: 600; 310; 30; 20 INJECTION, SOLUTION INTRAVENOUS at 14:36:00

## 2025-01-07 RX ADMIN — MIDAZOLAM HYDROCHLORIDE 2 MG: 1 INJECTION INTRAMUSCULAR; INTRAVENOUS at 14:53:00

## 2025-01-07 RX ADMIN — METOCLOPRAMIDE 10 MG: 10 TABLET ORAL at 14:25:00

## 2025-01-07 RX ADMIN — SODIUM CHLORIDE, SODIUM LACTATE, POTASSIUM CHLORIDE, CALCIUM CHLORIDE: 600; 310; 30; 20 INJECTION, SOLUTION INTRAVENOUS at 14:44:00

## 2025-01-07 RX ADMIN — FAMOTIDINE 20 MG: 20 TABLET, FILM COATED ORAL at 14:25:00

## 2025-01-07 RX ADMIN — DEXAMETHASONE SODIUM PHOSPHATE 4 MG: 4 MG/ML VIAL (ML) INJECTION at 14:59:00

## 2025-01-07 NOTE — DISCHARGE INSTRUCTIONS
HOME INSTRUCTIONS  [facility] HOME CARE INSTRUCTIONS: POST-OP ANESTHESIA  The medication that you received for sedation or general anesthesia can last up to 24 hours. Your judgment and reflexes may be altered, even if you feel like your normal self.      We Recommend:   Do not drive any motor vehicle or bicycle   Avoid mowing the lawn, playing sports, or working with power tools/applicances (power saws, electric knives or mixers)   That you have someone stay with you on your first night home   Do not drink alcohol or take sleeping pills or tranquilizers   Do not sign legal documents within 24 hours of your procedure   If you had a nerve block for your surgery, take extra care not to put any pressure on your arm or hand for 24 hours    It is normal:  For you to have a sore throat if you had a breathing tube during surgery (while you were asleep!). The sore throat should get better within 48 hours. You can gargle with warm salt water (1/2 tsp in 4 oz warm water) or use a throat lozenge for comfort  To feel muscle aches or soreness especially in the abdomen, chest or neck. The achy feeling should go away in the next 24 hours  To feel weak, sleepy or \"wiped out\". Your should start feeling better in the next 24 hours.   To experience mild discomforts such as sore lip or tongue, headache, cramps, gas pains or a bloated feeling in your abdomen.   To experience mild back pain or soreness for a day or two if you had spinal or epidural anesthesia.   If you had laparoscopic surgery, to feel shoulder pain or discomfort on the day of surgery.   For some patients to have nausea after surgery/anesthesia    If you feel nausea or experience vomiting:   Try to move around less.   Eat less than usual or drink only liquids until the next morning   Nausea should resolve in about 24 hours    If you have a problem when you are at home:    Call your surgeons office

## 2025-01-07 NOTE — ANESTHESIA POSTPROCEDURE EVALUATION
Patient: Cesar Llanos    Procedure Summary       Date: 01/07/25 Room / Location: [facility] MRI; [facility] Post Anesthesia Care Unit    Anesthesia Start: 1443 Anesthesia Stop: 1542    Procedure: MRI SPINE CERVICAL (CPT=72141) Diagnosis: Cervical radiculopathy    Scheduled Providers: Anuradha Perez MD Anesthesiologist: Anuradha Perez MD    Anesthesia Type: MAC ASA Status: 3            Anesthesia Type: MAC    Vitals Value Taken Time   /68 01/07/25 1536   Temp 98.1 °F (36.7 °C) 01/07/25 1536   Pulse 65 01/07/25 1542   Resp 14 01/07/25 1542   SpO2 95 % 01/07/25 1542   Vitals shown include unfiled device data.    [facility] AN Post Evaluation:   Patient Evaluated in PACU  Patient Participation: complete - patient participated  Level of Consciousness: awake and alert  Pain Score: 0  Pain Management: adequate  Airway Patency:patent  Dental exam unchanged from preop  Yes    Nausea/Vomiting: none  Cardiovascular Status: acceptable  Respiratory Status: acceptable  Postoperative Hydration acceptable      Anuradha Perez MD  1/7/2025 3:43 PM

## 2025-01-07 NOTE — ANESTHESIA PREPROCEDURE EVALUATION
Anesthesia PreOp Note    HPI:     Cesar Llanos is a 29 year old male who presents for preoperative consultation requested by: * No surgeons listed *    Date of Surgery: 1/7/2025    * No procedures listed *  Indication: * No pre-op diagnosis entered *    Relevant Problems   No relevant active problems       NPO:  Last Liquid Consumption Date: 01/06/25     Last Solid Consumption Date: 01/06/25     Last Liquid Consumption Date: 01/06/25          History Review:  Patient Active Problem List    Diagnosis Date Noted    Allergic rhinitis due to pollen 09/27/2010    Allergic rhinitis due to other allergen 12/30/2009    Other chronic sinusitis 12/30/2009    Hypertrophy of adenoids alone 12/30/2009    DNS (deviated nasal septum) 12/30/2009       Past Medical History:    Anxiety    Back pain    Back problem    CP (cerebral palsy) (HCC)    ANA (generalized anxiety disorder)    Neuropathy    PONV (postoperative nausea and vomiting)       Past Surgical History:   Procedure Laterality Date    Adenoidectomy      Appendectomy      Spine surgery procedure unlisted  11/22/2024    lumbar       Prescriptions Prior to Admission[1]  Current Medications and Prescriptions Ordered in Epic[2]    Allergies[3]    Family History   Problem Relation Age of Onset    Diabetes Mother      Social History     Socioeconomic History    Marital status: Single   Tobacco Use    Smoking status: Never    Smokeless tobacco: Never   Vaping Use    Vaping status: Never Used   Substance and Sexual Activity    Alcohol use: Yes     Comment: 2 x weekly    Drug use: Never   Other Topics Concern    Caffeine Concern Yes     Comment: 1 cup coffee daily    Exercise Yes     Comment: Golfer       Available pre-op labs reviewed.  Lab Results   Component Value Date    WBC 8.5 12/17/2024    RBC 4.76 12/17/2024    HGB 15.2 12/17/2024    HCT 44.3 12/17/2024    MCV 93.1 12/17/2024    MCH 31.9 12/17/2024    MCHC 34.3 12/17/2024    RDW 12.1 12/17/2024    .0 12/17/2024      Lab Results   Component Value Date     12/17/2024    K 4.5 12/17/2024     12/17/2024    CO2 29.0 12/17/2024    BUN 14 12/17/2024    CREATSERUM 1.10 12/17/2024     (H) 12/17/2024    CA 10.0 12/17/2024          Vital Signs:  Body mass index is 32.42 kg/m².   height is 1.702 m (5' 7\") and weight is 93.9 kg (207 lb). His oral temperature is 97.4 °F (36.3 °C). His blood pressure is 115/67 and his pulse is 64. His respiration is 20 and oxygen saturation is 96%.   Vitals:    01/07/25 1415   BP: 115/67   Pulse: 64   Resp: 20   Temp: 97.4 °F (36.3 °C)   TempSrc: Oral   SpO2: 96%   Weight: 93.9 kg (207 lb)   Height: 1.702 m (5' 7\")        Anesthesia Evaluation     Patient summary reviewed and Nursing notes reviewed    Airway   Mallampati: I  TM distance: >3 FB  Neck ROM: limited  Dental - Dentition appears grossly intact     Pulmonary - normal exam   Cardiovascular - negative ROS and normal exam    Neuro/Psych    (+)  neuromuscular disease (CP), anxiety/panic attacks,        GI/Hepatic/Renal - negative ROS     Endo/Other - negative ROS   Abdominal                  Anesthesia Plan:   ASA:  3  Plan:   MAC  Post-op Pain Management: IV analgesics  Plan Comments: Will attempt with IV sedation + NC first, GA with LMA as backup. Patient understands and prefers to try without GA first.  Informed Consent Plan and Risks Discussed With:  Patient  Discussed plan with:  Attending      I have informed Cesar ELIAZAR Llanos and/or legal guardian or family member of the nature of the anesthetic plan, benefits, risks including possible dental damage if relevant, major complications, and any alternative forms of anesthetic management.   All of the patient's questions were answered to the best of my ability. The patient desires the anesthetic management as planned.  Anuradha Perez MD  1/7/2025 2:41 PM  Present on Admission:  **None**           [1] (Not in a hospital admission)  [2]   Current Outpatient Medications Ordered in  Epic   Medication Sig Dispense Refill    cyclobenzaprine 10 MG Oral Tab Take 1 tablet (10 mg total) by mouth 3 (three) times daily.      amphetamine-dextroamphetamine 20 MG Oral Tab Take 1 tablet by mouth 2 (two) times daily. FOR 30 DAYS      ALPRAZolam 1 MG Oral Tab Take 1 tablet (1 mg total) by mouth 3 (three) times daily as needed.       Current Facility-Administered Medications Ordered in Epic   Medication Dose Route Frequency Provider Last Rate Last Admin    lactated ringers infusion   Intravenous Continuous Sony Alonso MD 20 mL/hr at 01/07/25 1436 New Bag at 01/07/25 1436   [3]   Allergies  Allergen Reactions    Seasonal ITCHING     Cockroach, trees, grass, dust mites

## 2025-01-07 NOTE — ANESTHESIA PROCEDURE NOTES
Airway  Date/Time: 1/7/2025 3:02 PM  Urgency: Elective    Airway not difficult    General Information and Staff    Patient location during procedure: OR  Anesthesiologist: Anuradha Perez MD  Performed: anesthesiologist   Performed by: Anuradha Perez MD  Authorized by: Anuradha Perez MD      Indications and Patient Condition  Indications for airway management: anesthesia  Sedation level: deep  Preoxygenated: yes  Patient position: sniffing  Mask difficulty assessment: 0 - not attempted    Final Airway Details  Final airway type: supraglottic airway      Successful airway: classic       Number of attempts at approach: 1

## 2025-01-09 ENCOUNTER — HOSPITAL ENCOUNTER (INPATIENT)
Facility: HOSPITAL | Age: 30
LOS: 1 days | Discharge: HOME | End: 2025-01-10
Attending: EMERGENCY MEDICINE | Admitting: HOSPITALIST
Payer: COMMERCIAL

## 2025-01-09 ENCOUNTER — ANESTHESIA EVENT (OUTPATIENT)
Dept: SURGERY | Facility: HOSPITAL | Age: 30
End: 2025-01-09
Payer: COMMERCIAL

## 2025-01-09 DIAGNOSIS — G95.9 CERVICAL MYELOPATHY (HCC): Primary | ICD-10-CM

## 2025-01-09 LAB
ANION GAP SERPL CALC-SCNC: 8 MMOL/L (ref 0–18)
BASOPHILS # BLD AUTO: 0.05 X10(3) UL (ref 0–0.2)
BASOPHILS NFR BLD AUTO: 0.4 %
BUN BLD-MCNC: 17 MG/DL (ref 9–23)
BUN/CREAT SERPL: 16.8 (ref 10–20)
CALCIUM BLD-MCNC: 9.9 MG/DL (ref 8.7–10.4)
CHLORIDE SERPL-SCNC: 103 MMOL/L (ref 98–112)
CO2 SERPL-SCNC: 28 MMOL/L (ref 21–32)
CREAT BLD-MCNC: 1.01 MG/DL
DEPRECATED RDW RBC AUTO: 39.5 FL (ref 35.1–46.3)
EGFRCR SERPLBLD CKD-EPI 2021: 102 ML/MIN/1.73M2 (ref 60–?)
EOSINOPHIL # BLD AUTO: 0.08 X10(3) UL (ref 0–0.7)
EOSINOPHIL NFR BLD AUTO: 0.7 %
ERYTHROCYTE [DISTWIDTH] IN BLOOD BY AUTOMATED COUNT: 12 % (ref 11–15)
GLUCOSE BLD-MCNC: 96 MG/DL (ref 70–99)
HCT VFR BLD AUTO: 44.8 %
HGB BLD-MCNC: 16.4 G/DL
IMM GRANULOCYTES # BLD AUTO: 0.05 X10(3) UL (ref 0–1)
IMM GRANULOCYTES NFR BLD: 0.4 %
LYMPHOCYTES # BLD AUTO: 1.79 X10(3) UL (ref 1–4)
LYMPHOCYTES NFR BLD AUTO: 14.6 %
MCH RBC QN AUTO: 33.1 PG (ref 26–34)
MCHC RBC AUTO-ENTMCNC: 36.6 G/DL (ref 31–37)
MCV RBC AUTO: 90.3 FL
MONOCYTES # BLD AUTO: 0.9 X10(3) UL (ref 0.1–1)
MONOCYTES NFR BLD AUTO: 7.3 %
NEUTROPHILS # BLD AUTO: 9.41 X10 (3) UL (ref 1.5–7.7)
NEUTROPHILS # BLD AUTO: 9.41 X10(3) UL (ref 1.5–7.7)
NEUTROPHILS NFR BLD AUTO: 76.6 %
OSMOLALITY SERPL CALC.SUM OF ELEC: 289 MOSM/KG (ref 275–295)
PLATELET # BLD AUTO: 271 10(3)UL (ref 150–450)
POTASSIUM SERPL-SCNC: 4 MMOL/L (ref 3.5–5.1)
RBC # BLD AUTO: 4.96 X10(6)UL
SODIUM SERPL-SCNC: 139 MMOL/L (ref 136–145)
WBC # BLD AUTO: 12.3 X10(3) UL (ref 4–11)

## 2025-01-09 PROCEDURE — 99222 1ST HOSP IP/OBS MODERATE 55: CPT | Performed by: HOSPITALIST

## 2025-01-09 RX ORDER — ACETAMINOPHEN 325 MG/1
650 TABLET ORAL EVERY 4 HOURS PRN
Status: DISCONTINUED | OUTPATIENT
Start: 2025-01-09 | End: 2025-01-10

## 2025-01-09 RX ORDER — HYDROCODONE BITARTRATE AND ACETAMINOPHEN 5; 325 MG/1; MG/1
1 TABLET ORAL EVERY 4 HOURS PRN
Status: DISCONTINUED | OUTPATIENT
Start: 2025-01-09 | End: 2025-01-10

## 2025-01-09 RX ORDER — ALPRAZOLAM 0.5 MG
1 TABLET ORAL ONCE
Status: COMPLETED | OUTPATIENT
Start: 2025-01-09 | End: 2025-01-09

## 2025-01-09 RX ORDER — ONDANSETRON 2 MG/ML
4 INJECTION INTRAMUSCULAR; INTRAVENOUS EVERY 6 HOURS PRN
Status: DISCONTINUED | OUTPATIENT
Start: 2025-01-09 | End: 2025-01-10

## 2025-01-09 RX ORDER — ALPRAZOLAM 1 MG/1
1 TABLET ORAL 3 TIMES DAILY PRN
Status: DISCONTINUED | OUTPATIENT
Start: 2025-01-09 | End: 2025-01-10

## 2025-01-09 RX ORDER — DIAZEPAM 5 MG/1
5 TABLET ORAL ONCE
Status: DISCONTINUED | OUTPATIENT
Start: 2025-01-09 | End: 2025-01-09

## 2025-01-09 RX ORDER — ACETAMINOPHEN 500 MG
500 TABLET ORAL EVERY 4 HOURS PRN
Status: DISCONTINUED | OUTPATIENT
Start: 2025-01-09 | End: 2025-01-10

## 2025-01-09 RX ORDER — HYDROCODONE BITARTRATE AND ACETAMINOPHEN 5; 325 MG/1; MG/1
2 TABLET ORAL EVERY 4 HOURS PRN
Status: DISCONTINUED | OUTPATIENT
Start: 2025-01-09 | End: 2025-01-10

## 2025-01-09 RX ORDER — MORPHINE SULFATE 4 MG/ML
4 INJECTION, SOLUTION INTRAMUSCULAR; INTRAVENOUS ONCE
Status: COMPLETED | OUTPATIENT
Start: 2025-01-09 | End: 2025-01-09

## 2025-01-09 RX ORDER — MORPHINE SULFATE 4 MG/ML
4 INJECTION, SOLUTION INTRAMUSCULAR; INTRAVENOUS EVERY 2 HOUR PRN
Status: DISCONTINUED | OUTPATIENT
Start: 2025-01-09 | End: 2025-01-10

## 2025-01-09 RX ORDER — MORPHINE SULFATE 2 MG/ML
2 INJECTION, SOLUTION INTRAMUSCULAR; INTRAVENOUS EVERY 2 HOUR PRN
Status: DISCONTINUED | OUTPATIENT
Start: 2025-01-09 | End: 2025-01-10

## 2025-01-09 RX ORDER — CETIRIZINE HYDROCHLORIDE 10 MG/1
10 TABLET ORAL DAILY
COMMUNITY

## 2025-01-09 RX ORDER — CYCLOBENZAPRINE HCL 10 MG
10 TABLET ORAL 3 TIMES DAILY
Status: DISCONTINUED | OUTPATIENT
Start: 2025-01-09 | End: 2025-01-10

## 2025-01-09 RX ORDER — MORPHINE SULFATE 2 MG/ML
1 INJECTION, SOLUTION INTRAMUSCULAR; INTRAVENOUS EVERY 2 HOUR PRN
Status: DISCONTINUED | OUTPATIENT
Start: 2025-01-09 | End: 2025-01-10

## 2025-01-09 RX ORDER — TEMAZEPAM 15 MG/1
15 CAPSULE ORAL NIGHTLY PRN
Status: DISCONTINUED | OUTPATIENT
Start: 2025-01-09 | End: 2025-01-10

## 2025-01-09 RX ORDER — PROCHLORPERAZINE EDISYLATE 5 MG/ML
5 INJECTION INTRAMUSCULAR; INTRAVENOUS EVERY 8 HOURS PRN
Status: DISCONTINUED | OUTPATIENT
Start: 2025-01-09 | End: 2025-01-10

## 2025-01-09 NOTE — ED PROVIDER NOTES
Patient Seen in: [facility] Emergency Department    History     Chief Complaint   Patient presents with    Pain       HPI    29-year-old male who was brought to the emergency department given plan for admission for cervical surgery tomorrow, reports several months of right paresthesias and weakness at the right upper extremity.  Has a history of cerebral palsy as well.  Had MRI 2 days ago.    History reviewed.   Past Medical History:    Anxiety    Back pain    Back problem    CP (cerebral palsy) (HCC)    ANA (generalized anxiety disorder)    Neuropathy    PONV (postoperative nausea and vomiting)       History reviewed.   Past Surgical History:   Procedure Laterality Date    Adenoidectomy      Appendectomy      Spine surgery procedure unlisted  11/22/2024    lumbar         Medications :  Prescriptions Prior to Admission[1]     Family History   Problem Relation Age of Onset    Diabetes Mother        Smoking Status:   Social History     Socioeconomic History    Marital status: Single   Tobacco Use    Smoking status: Never    Smokeless tobacco: Never   Vaping Use    Vaping status: Never Used   Substance and Sexual Activity    Alcohol use: Yes     Comment: 2 x weekly    Drug use: Never   Other Topics Concern    Caffeine Concern Yes     Comment: 1 cup coffee daily    Exercise Yes     Comment: Iman       Constitutional and vital signs reviewed.      Social History and Family History elements reviewed from today, pertinent positives to the presenting problem noted.    Physical Exam     ED Triage Vitals [01/09/25 1445]   /81   Pulse 101   Resp 20   Temp 97.7 °F (36.5 °C)   Temp src Temporal   SpO2 100 %   O2 Device None (Room air)       All measures to prevent infection transmission during my interaction with the patient were taken. The patient was already wearing a droplet mask on my arrival to the room. Personal protective equipment was worn throughout the duration of the exam.  Handwashing was performed  prior to and after the exam.  Stethoscope and any equipment used during my examination was cleaned with super sani-cloth germicidal wipes following the exam.     Physical Exam    General: NAD  Head: Normocephalic and atraumatic.  Mouth/Throat/Ears/Nose: No hoarseness of voice  Eyes: Conjunctivae and EOM are normal.  Neck: Normal range of motion. Supple.   Cardiovascular: Normal rate  Respiratory/Chest: No tachypnea.  Gastrointestinal: Nondistended  Musculoskeletal:No swelling or deformity.   Neurological: Alert and appropriate.  Right hand 4+ strength, 2+ radial pulses at the right hand  Skin: Skin is warm and dry. No pallor.  Psychiatric: Has a normal mood and affect.      ED Course        Labs Reviewed   CBC WITH DIFFERENTIAL WITH PLATELET - Abnormal; Notable for the following components:       Result Value    WBC 12.3 (*)     Neutrophil Absolute Prelim 9.41 (*)     Neutrophil Absolute 9.41 (*)     All other components within normal limits   BASIC METABOLIC PANEL (8) - Normal       As Interpreted by me    Imaging Results Available and Reviewed while in ED: No results found.  ED Medications Administered:   Medications   morphINE PF 4 MG/ML injection 4 mg (4 mg Intravenous Given 1/9/25 1607)   ALPRAZolam (Xanax) tab 1 mg (1 mg Oral Given 1/9/25 1620)         MDM     Vitals:    01/09/25 1445 01/09/25 1516   BP: 132/81 99/64   Pulse: 101 107   Resp: 20 22   Temp: 97.7 °F (36.5 °C)    TempSrc: Temporal    SpO2: 100% 100%     *I personally reviewed and interpreted all ED vitals.    Pulse Ox: 100%, Room air, Normal     Medical Decision Making      Differential Diagnosis/ Diagnostic Considerations: Cervical myelopathy    Complicating Factors: The patient already has cerebral  palsy to contribute to the complexity of this ED evaluation.    I reviewed prior chart records including ED note from November 10, 2024 as well as H&P from earlier today.  Patient is admitted for cervical surgery tomorrow.  Analgesia ordered.   Admitted to and discussed with . Labs unremarkable.     Admitted In stable condition.  Patient is comfortable with the plan.         Disposition and Plan     Clinical Impression:  1. Cervical myelopathy (HCC)        Disposition:  Admit    Follow-up:  No follow-up provider specified.    Medications Prescribed:  Current Discharge Medication List          Hospital Problems       Present on Admission  Date Reviewed: 8/7/2023            ICD-10-CM Noted POA    Cervical myelopathy (HCC) G95.9 1/9/2025 Unknown                       [1] (Not in a hospital admission)      Name band;

## 2025-01-09 NOTE — H&P
[facility]    PATIENT'S NAME: SHIELA MICHEL   ATTENDING PHYSICIAN: Jeannie Jacobson MD   PATIENT ACCOUNT#:   [id number]    LOCATION:  [facility]  MEDICAL RECORD #:   [id number]       YOB: 1995  ADMISSION DATE:       01/09/2025    HISTORY AND PHYSICAL EXAMINATION    CHIEF COMPLAINT:  Cervical spinal stenosis with radiculopathy and myelopathy.    HISTORY OF PRESENT ILLNESS:  Patient is a 29-year-old  male with chronic and progressive right-sided neck pain radiating to his right upper extremity.  He had an MRI scan of the cervical spine done on January 7, which showed multilevel degenerative joint disease of the cervical spine with lordotic reversal at C4-C5 level.  There is moderate spinal canal stenosis and indentation of the ventral cord's contour with severe right greater than left foraminal narrowing.  Patient came in today to the emergency department for evaluation.  He is scheduled tomorrow for anterior cervical discectomy and fusion by Dr. Sony Alonso.      PAST MEDICAL HISTORY:  Anxiety and attention deficit disorder, degenerative joint disease of cervical and lumbar spine.    PAST SURGICAL HISTORY:  Lumbar laminectomy, L4-L5.  Also history of appendectomy and adenoidectomy.    MEDICATIONS:  Please see medication reconciliation list.     FAMILY HISTORY:  Mother had diabetes mellitus type 2.    SOCIAL HISTORY:  No tobacco or drug use.  Occasional alcohol.  Lives with his family.  Independent in his basic activities of daily living.     REVIEW OF SYSTEMS:  Intense pain radiates from the neck to the shoulder and arm/forearm involving the right second, third and fourth digits with sometimes weak handgrip.  Pain gets worse with neck movement.  No recent trauma.  Other 12-point review of systems is negative.       PHYSICAL EXAMINATION:    GENERAL:  Alert and oriented to time, place and person.  Mild to moderate distress.   VITAL SIGNS:  Temperature 97.7, pulse 107,  respiratory rate 22, blood pressure 99/64, pulse ox 100% on room air.   HEENT:  Atraumatic.  Oropharynx clear.  Moist mucous membranes.  Ears and nose normal.  Eyes:  Anicteric sclerae.   NECK:  Supple.  No lymphadenopathy.  Trachea midline.  Full range of motion.  Right neck torticollis toward the left to compensate for right shoulder and right upper extremity pain.  LUNGS:  Clear to auscultation bilaterally.  Normal respiratory effort.    HEART:  Regular rate and rhythm.  S1 and S2 auscultated.  No murmur.    ABDOMEN:  Soft, nondistended.  No tenderness.  Positive bowel sounds.   EXTREMITIES:  No peripheral edema, clubbing or cyanosis.     ASSESSMENT:  Cervical spinal stenosis with radiculopathy and myelopathy.      PLAN:  Patient will be admitted to general medical floor.  Pain control.  N.p.o. after midnight.  Orthopedic spine surgery consult.  Anterior cervical discectomy and fusion procedure scheduled for tomorrow morning.  Further recommendations to follow.     Dictated By Jeannie Jacobson MD  d: 01/09/2025 16:29:24  t: 01/09/2025 17:00:24  Job [id number]  /

## 2025-01-09 NOTE — ED INITIAL ASSESSMENT (HPI)
Patient presents to ED with abnormal MRI. Per patient he was having right arm pain for a while and is scheduled for surgery tomorrow. Per patient he was told to come to ED for admission prior to surgery d/t a disc hitting spinal cord.

## 2025-01-09 NOTE — PLAN OF CARE
Problem: Patient Centered Care  Goal: Patient preferences are identified and integrated in the patient's plan of care  Description: Interventions:  - What would you like us to know as we care for you? Lives at home with family  - Provide timely, complete, and accurate information to patient/family  - Incorporate patient and family knowledge, values, beliefs, and cultural backgrounds into the planning and delivery of care  - Encourage patient/family to participate in care and decision-making at the level they choose  - Honor patient and family perspectives and choices  Note: Received from ER with diagnosis of cervical myelopathy. Patient alert and oriented. Denies any complaint of pain upon arrival in [facility]. Assisted to bed, oriented to room and call light. Vital signs taken. Discussed plan of care and NPO status after midnight, verbalized understanding. Continue to monitor. Bed on low and locked position, call light within reach.

## 2025-01-09 NOTE — ED QUICK NOTES
Orders for admission, patient is aware of plan and ready to go upstairs. Any questions, please call ED RN Christine at extension [phone].     Patient Covid vaccination status: Fully vaccinated     COVID Test Ordered in ED: None    COVID Suspicion at Admission: N/A    Running Infusions:  None    Mental Status/LOC at time of transport: AOx4    Other pertinent information:   CIWA score: N/A   NIH score:  N/A

## 2025-01-09 NOTE — H&P
.[facility]  part of [facility]    History & Physical    Cesar Llanos Patient Status:  Emergency    1995 MRN [id number]   Location [facility] EMERGENCY DEPARTMENT Attending No att. providers found   Hosp Day # 0 PCP Lea Dorantes DO     Date:  2025  Date of Admission:  24    History:  Past Medical History:    Anxiety    Back pain    Back problem    CP (cerebral palsy) (HCC)    ANA (generalized anxiety disorder)    Neuropathy    PONV (postoperative nausea and vomiting)     Past Surgical History:   Procedure Laterality Date    Adenoidectomy      Appendectomy      Spine surgery procedure unlisted  2024    lumbar     Family History   Problem Relation Age of Onset    Diabetes Mother       reports that he has never smoked. He has never used smokeless tobacco. He reports current alcohol use. He reports that he does not use drugs.    Allergies:  Allergies[1]    Home Medications:  Prescriptions Prior to Admission[2]    Review of Systems:  12 point ROS reviewed without pertinent positives.     HPI: The patient presents with complaints of neck pain with radiculopathy.  The pain radiates from the posterior neck to the RUE  .He denies prior cervical spine surgery.  They deny current fevers, chills, infection, rashes/bruises on the body, gross bowel/bladder incontinence or saddle anesthesia.     Physical Exam:  The patient is well developed, no acute distress, alert and oriented x3, skin intact to the anterior cervical without erythema or edema, motor exam with 5/5 left  upper extremity with a pain limited grossly weak RUE, 2+ radial pulses    Laboratory Data:          Assessment:  Patient Active Problem List   Diagnosis    Allergic rhinitis due to other allergen    Other chronic sinusitis    Hypertrophy of adenoids alone    DNS (deviated nasal septum)    Allergic rhinitis due to pollen         Plan:    - medical admission   - NPO midnight  - clearance for OR tomorrow   -  plan for ACDF C4-5      Christ Sandy MD  1/9/2025  3:05 PM       [1]   Allergies  Allergen Reactions    Seasonal ITCHING     Cockroach, trees, grass, dust mites   [2] (Not in a hospital admission)

## 2025-01-10 ENCOUNTER — ANESTHESIA (OUTPATIENT)
Dept: SURGERY | Facility: HOSPITAL | Age: 30
End: 2025-01-10
Payer: COMMERCIAL

## 2025-01-10 ENCOUNTER — APPOINTMENT (OUTPATIENT)
Dept: GENERAL RADIOLOGY | Facility: HOSPITAL | Age: 30
End: 2025-01-10
Attending: ORTHOPAEDIC SURGERY
Payer: COMMERCIAL

## 2025-01-10 VITALS
HEART RATE: 72 BPM | DIASTOLIC BLOOD PRESSURE: 61 MMHG | BODY MASS INDEX: 30.84 KG/M2 | OXYGEN SATURATION: 95 % | HEIGHT: 68 IN | TEMPERATURE: 98 F | SYSTOLIC BLOOD PRESSURE: 110 MMHG | RESPIRATION RATE: 16 BRPM | WEIGHT: 203.5 LBS

## 2025-01-10 LAB
ANION GAP SERPL CALC-SCNC: 6 MMOL/L (ref 0–18)
BASOPHILS # BLD AUTO: 0.06 X10(3) UL (ref 0–0.2)
BASOPHILS NFR BLD AUTO: 0.7 %
BUN BLD-MCNC: 16 MG/DL (ref 9–23)
BUN/CREAT SERPL: 15.8 (ref 10–20)
CALCIUM BLD-MCNC: 10.1 MG/DL (ref 8.7–10.4)
CHLORIDE SERPL-SCNC: 104 MMOL/L (ref 98–112)
CO2 SERPL-SCNC: 31 MMOL/L (ref 21–32)
CREAT BLD-MCNC: 1.01 MG/DL
DEPRECATED RDW RBC AUTO: 42.3 FL (ref 35.1–46.3)
EGFRCR SERPLBLD CKD-EPI 2021: 102 ML/MIN/1.73M2 (ref 60–?)
EOSINOPHIL # BLD AUTO: 0.22 X10(3) UL (ref 0–0.7)
EOSINOPHIL NFR BLD AUTO: 2.6 %
ERYTHROCYTE [DISTWIDTH] IN BLOOD BY AUTOMATED COUNT: 12.3 % (ref 11–15)
GLUCOSE BLD-MCNC: 95 MG/DL (ref 70–99)
HCT VFR BLD AUTO: 46.2 %
HGB BLD-MCNC: 16.2 G/DL
IMM GRANULOCYTES # BLD AUTO: 0.04 X10(3) UL (ref 0–1)
IMM GRANULOCYTES NFR BLD: 0.5 %
LYMPHOCYTES # BLD AUTO: 1.96 X10(3) UL (ref 1–4)
LYMPHOCYTES NFR BLD AUTO: 23.4 %
MCH RBC QN AUTO: 33 PG (ref 26–34)
MCHC RBC AUTO-ENTMCNC: 35.1 G/DL (ref 31–37)
MCV RBC AUTO: 94.1 FL
MONOCYTES # BLD AUTO: 0.86 X10(3) UL (ref 0.1–1)
MONOCYTES NFR BLD AUTO: 10.3 %
MRSA DNA SPEC QL NAA+PROBE: NEGATIVE
NEUTROPHILS # BLD AUTO: 5.22 X10 (3) UL (ref 1.5–7.7)
NEUTROPHILS # BLD AUTO: 5.22 X10(3) UL (ref 1.5–7.7)
NEUTROPHILS NFR BLD AUTO: 62.5 %
OSMOLALITY SERPL CALC.SUM OF ELEC: 293 MOSM/KG (ref 275–295)
PLATELET # BLD AUTO: 213 10(3)UL (ref 150–450)
POTASSIUM SERPL-SCNC: 4.7 MMOL/L (ref 3.5–5.1)
RBC # BLD AUTO: 4.91 X10(6)UL
SODIUM SERPL-SCNC: 141 MMOL/L (ref 136–145)
WBC # BLD AUTO: 8.4 X10(3) UL (ref 4–11)

## 2025-01-10 PROCEDURE — 76000 FLUOROSCOPY <1 HR PHYS/QHP: CPT | Performed by: ORTHOPAEDIC SURGERY

## 2025-01-10 PROCEDURE — 99239 HOSP IP/OBS DSCHRG MGMT >30: CPT | Performed by: HOSPITALIST

## 2025-01-10 PROCEDURE — 0RB30ZZ EXCISION OF CERVICAL VERTEBRAL DISC, OPEN APPROACH: ICD-10-PCS | Performed by: ORTHOPAEDIC SURGERY

## 2025-01-10 PROCEDURE — 0RG10A0 FUSION OF CERVICAL VERTEBRAL JOINT WITH INTERBODY FUSION DEVICE, ANTERIOR APPROACH, ANTERIOR COLUMN, OPEN APPROACH: ICD-10-PCS | Performed by: ORTHOPAEDIC SURGERY

## 2025-01-10 PROCEDURE — 4A11X4G MONITORING OF PERIPHERAL NERVOUS ELECTRICAL ACTIVITY, INTRAOPERATIVE, EXTERNAL APPROACH: ICD-10-PCS | Performed by: ORTHOPAEDIC SURGERY

## 2025-01-10 DEVICE — SCREW DSTRCTR 14MM MAXCESS-C: Type: IMPLANTABLE DEVICE | Site: SPINE CERVICAL

## 2025-01-10 DEVICE — IMPLANTABLE DEVICE: Type: IMPLANTABLE DEVICE | Site: SPINE CERVICAL | Status: FUNCTIONAL

## 2025-01-10 DEVICE — GRAFT BNE SUB PROCELLULAR SPNL MTRX OSTEOCEL: Type: IMPLANTABLE DEVICE | Site: SPINE CERVICAL | Status: FUNCTIONAL

## 2025-01-10 DEVICE — SCREW SPNL 3.5X17MM ANT CERV TI ALLY ST: Type: IMPLANTABLE DEVICE | Site: SPINE CERVICAL | Status: FUNCTIONAL

## 2025-01-10 DEVICE — COHERE CERVICAL, 8X16X14MM 7°
Type: IMPLANTABLE DEVICE | Site: SPINE CERVICAL | Status: FUNCTIONAL
Brand: COHERE

## 2025-01-10 RX ORDER — ONDANSETRON 2 MG/ML
INJECTION INTRAMUSCULAR; INTRAVENOUS AS NEEDED
Status: DISCONTINUED | OUTPATIENT
Start: 2025-01-10 | End: 2025-01-10 | Stop reason: SURG

## 2025-01-10 RX ORDER — GLYCOPYRROLATE 0.2 MG/ML
INJECTION, SOLUTION INTRAMUSCULAR; INTRAVENOUS AS NEEDED
Status: DISCONTINUED | OUTPATIENT
Start: 2025-01-10 | End: 2025-01-10 | Stop reason: SURG

## 2025-01-10 RX ORDER — BUPIVACAINE HYDROCHLORIDE AND EPINEPHRINE 5; 5 MG/ML; UG/ML
INJECTION, SOLUTION PERINEURAL AS NEEDED
Status: DISCONTINUED | OUTPATIENT
Start: 2025-01-10 | End: 2025-01-10 | Stop reason: HOSPADM

## 2025-01-10 RX ORDER — LIDOCAINE HYDROCHLORIDE 10 MG/ML
INJECTION, SOLUTION EPIDURAL; INFILTRATION; INTRACAUDAL; PERINEURAL AS NEEDED
Status: DISCONTINUED | OUTPATIENT
Start: 2025-01-10 | End: 2025-01-10 | Stop reason: SURG

## 2025-01-10 RX ORDER — BUPIVACAINE HYDROCHLORIDE 5 MG/ML
INJECTION, SOLUTION EPIDURAL; INTRACAUDAL AS NEEDED
Status: DISCONTINUED | OUTPATIENT
Start: 2025-01-10 | End: 2025-01-10

## 2025-01-10 RX ORDER — HYDROMORPHONE HYDROCHLORIDE 1 MG/ML
INJECTION, SOLUTION INTRAMUSCULAR; INTRAVENOUS; SUBCUTANEOUS AS NEEDED
Status: DISCONTINUED | OUTPATIENT
Start: 2025-01-10 | End: 2025-01-10 | Stop reason: SURG

## 2025-01-10 RX ORDER — ROCURONIUM BROMIDE 10 MG/ML
INJECTION, SOLUTION INTRAVENOUS AS NEEDED
Status: DISCONTINUED | OUTPATIENT
Start: 2025-01-10 | End: 2025-01-10 | Stop reason: SURG

## 2025-01-10 RX ORDER — DEXAMETHASONE SODIUM PHOSPHATE 4 MG/ML
VIAL (ML) INJECTION AS NEEDED
Status: DISCONTINUED | OUTPATIENT
Start: 2025-01-10 | End: 2025-01-10 | Stop reason: SURG

## 2025-01-10 RX ORDER — ONDANSETRON 2 MG/ML
4 INJECTION INTRAMUSCULAR; INTRAVENOUS EVERY 6 HOURS PRN
Status: DISCONTINUED | OUTPATIENT
Start: 2025-01-10 | End: 2025-01-10

## 2025-01-10 RX ORDER — PHENYLEPHRINE HCL 10 MG/ML
VIAL (ML) INJECTION AS NEEDED
Status: DISCONTINUED | OUTPATIENT
Start: 2025-01-10 | End: 2025-01-10 | Stop reason: SURG

## 2025-01-10 RX ORDER — OXYCODONE HYDROCHLORIDE 5 MG/1
10 TABLET ORAL EVERY 4 HOURS PRN
Status: DISCONTINUED | OUTPATIENT
Start: 2025-01-10 | End: 2025-01-10

## 2025-01-10 RX ORDER — MIDAZOLAM HYDROCHLORIDE 1 MG/ML
INJECTION INTRAMUSCULAR; INTRAVENOUS AS NEEDED
Status: DISCONTINUED | OUTPATIENT
Start: 2025-01-10 | End: 2025-01-10 | Stop reason: SURG

## 2025-01-10 RX ORDER — OXYCODONE HYDROCHLORIDE 5 MG/1
5 TABLET ORAL EVERY 4 HOURS PRN
Status: DISCONTINUED | OUTPATIENT
Start: 2025-01-10 | End: 2025-01-10

## 2025-01-10 RX ADMIN — DEXAMETHASONE SODIUM PHOSPHATE 10 MG: 4 MG/ML VIAL (ML) INJECTION at 08:38:00

## 2025-01-10 RX ADMIN — ROCURONIUM BROMIDE 15 MG: 10 INJECTION, SOLUTION INTRAVENOUS at 09:47:00

## 2025-01-10 RX ADMIN — LIDOCAINE HYDROCHLORIDE 50 MG: 10 INJECTION, SOLUTION EPIDURAL; INFILTRATION; INTRACAUDAL; PERINEURAL at 08:30:00

## 2025-01-10 RX ADMIN — ONDANSETRON 8 MG: 2 INJECTION INTRAMUSCULAR; INTRAVENOUS at 10:10:00

## 2025-01-10 RX ADMIN — ROCURONIUM BROMIDE 15 MG: 10 INJECTION, SOLUTION INTRAVENOUS at 09:25:00

## 2025-01-10 RX ADMIN — GLYCOPYRROLATE 0.2 MG: 0.2 INJECTION, SOLUTION INTRAMUSCULAR; INTRAVENOUS at 08:19:00

## 2025-01-10 RX ADMIN — ROCURONIUM BROMIDE 60 MG: 10 INJECTION, SOLUTION INTRAVENOUS at 08:58:00

## 2025-01-10 RX ADMIN — MIDAZOLAM HYDROCHLORIDE 2 MG: 1 INJECTION INTRAMUSCULAR; INTRAVENOUS at 08:19:00

## 2025-01-10 RX ADMIN — HYDROMORPHONE HYDROCHLORIDE 0.3 MG: 1 INJECTION, SOLUTION INTRAMUSCULAR; INTRAVENOUS; SUBCUTANEOUS at 10:03:00

## 2025-01-10 RX ADMIN — PHENYLEPHRINE HCL 100 MCG: 10 MG/ML VIAL (ML) INJECTION at 09:08:00

## 2025-01-10 RX ADMIN — HYDROMORPHONE HYDROCHLORIDE 0.2 MG: 1 INJECTION, SOLUTION INTRAMUSCULAR; INTRAVENOUS; SUBCUTANEOUS at 09:56:00

## 2025-01-10 NOTE — ANESTHESIA POSTPROCEDURE EVALUATION
Patient: Cesar Llanos    Procedure Summary       Date: 01/10/25 Room / Location: [facility] MAIN OR  / [facility] MAIN OR    Anesthesia Start: 0819 Anesthesia Stop: 1038    Procedure: C4-C5 ANTERIOR CERVICAL DISCECTOMY FUSION (Spine Cervical) Diagnosis:       Neck pain      (Neck pain [M54.2])    Surgeons: Sony Alonso MD Anesthesiologist: Consuelo La MD    Anesthesia Type: general ASA Status: 2            Anesthesia Type: general    Vitals Value Taken Time   /84 01/10/25 1112   Temp 97 °F (36.1 °C) 01/10/25 1112   Pulse 86 01/10/25 1113   Resp 17 01/10/25 1113   SpO2 99 % 01/10/25 1113   Vitals shown include unfiled device data.    [facility] AN Post Evaluation:   Patient Evaluated in PACU  Patient Participation: complete - patient participated  Level of Consciousness: awake and alert  Pain Score: 0  Pain Management: adequate  Airway Patency:patent  Dental exam unchanged from preop  Yes    Nausea/Vomiting: none  Cardiovascular Status: acceptable  Respiratory Status: acceptable  Postoperative Hydration stable      Linda Kyle CRNA  1/10/2025 11:13 AM

## 2025-01-10 NOTE — ANESTHESIA PREPROCEDURE EVALUATION
Anesthesia PreOp Note    HPI:     Cesar Llanos is a 29 year old male who presents for preoperative consultation requested by: Sony Alonso MD    Date of Surgery: 1/9/2025 - 1/10/2025    Procedure(s):  C4-C5 ANTERIOR CERVICAL DISCECTOMY FUSION  Indication: Neck pain [M54.2]    Relevant Problems   No relevant active problems       NPO:  Last Liquid Consumption Date: 01/09/25  Last Liquid Consumption Time: 2000  Last Solid Consumption Date: 01/09/25  Last Solid Consumption Time: 2000  Last Liquid Consumption Date: 01/09/25          History Review:  Patient Active Problem List    Diagnosis Date Noted    Cervical myelopathy (HCC) 01/09/2025    Allergic rhinitis due to pollen 09/27/2010    Allergic rhinitis due to other allergen 12/30/2009    Other chronic sinusitis 12/30/2009    Hypertrophy of adenoids alone 12/30/2009    DNS (deviated nasal septum) 12/30/2009       Past Medical History:    Anxiety    Back pain    Back problem    CP (cerebral palsy) (HCC)    ANA (generalized anxiety disorder)    Neuropathy    PONV (postoperative nausea and vomiting)       Past Surgical History:   Procedure Laterality Date    Adenoidectomy      Appendectomy      Spine surgery procedure unlisted  11/22/2024    lumbar       Prescriptions Prior to Admission[1]  Current Medications and Prescriptions Ordered in Epic[2]    Allergies[3]    Family History   Problem Relation Age of Onset    Diabetes Mother      Social History     Socioeconomic History    Marital status: Single   Tobacco Use    Smoking status: Never    Smokeless tobacco: Never   Vaping Use    Vaping status: Never Used   Substance and Sexual Activity    Alcohol use: Yes     Comment: 2 x weekly    Drug use: Never   Other Topics Concern    Caffeine Concern Yes     Comment: 1 cup coffee daily    Exercise Yes     Comment: Golfer       Available pre-op labs reviewed.  Lab Results   Component Value Date    WBC 8.4 01/10/2025    RBC 4.91 01/10/2025    HGB 16.2 01/10/2025    HCT  46.2 01/10/2025    MCV 94.1 01/10/2025    MCH 33.0 01/10/2025    MCHC 35.1 01/10/2025    RDW 12.3 01/10/2025    .0 01/10/2025     Lab Results   Component Value Date     01/10/2025    K 4.7 01/10/2025     01/10/2025    CO2 31.0 01/10/2025    BUN 16 01/10/2025    CREATSERUM 1.01 01/10/2025    GLU 95 01/10/2025    CA 10.1 01/10/2025          Vital Signs:  Body mass index is 30.94 kg/m².   height is 1.727 m (5' 8\") and weight is 92.3 kg (203 lb 8 oz). His oral temperature is 97.7 °F (36.5 °C). His blood pressure is 111/59 and his pulse is 67. His respiration is 18 and oxygen saturation is 99%.   Vitals:    01/09/25 1916 01/10/25 0441 01/10/25 0451 01/10/25 0732   BP: 119/72 99/67 103/56 111/59   Pulse: 72 60  67   Resp: 18 16  18   Temp: 98.8 °F (37.1 °C) 97.7 °F (36.5 °C)     TempSrc: Oral Oral     SpO2: 95% 98%  99%   Weight:       Height:            Anesthesia Evaluation      History of anesthetic complications   Airway   Mallampati: II  TM distance: >3 FB  Neck ROM: full  Dental - Dentition appears grossly intact     Pulmonary - normal exam   Cardiovascular - normal exam    ECG reviewed  Rhythm: regular  Rate: normal    Neuro/Psych    (+)  neuromuscular disease, anxiety/panic attacks,        Comments: CP    GI/Hepatic/Renal      Endo/Other    Abdominal     Abdomen: soft.                 Anesthesia Plan:   ASA:  2  Plan:   General  Airway:  ETT  Post-op Pain Management: IV analgesics and Oral pain medication  Informed Consent Plan and Risks Discussed With:  Patient  Discussed plan with:  CRNA      I have informed Cesar ELIAZAR Romi and/or legal guardian or family member of the nature of the anesthetic plan, benefits, risks including possible dental damage if relevant, major complications, and any alternative forms of anesthetic management.   All of the patient's questions were answered to the best of my ability. The patient desires the anesthetic management as planned.  Consuelo Leicht, MD  1/10/2025  8:04 AM  Present on Admission:  **None**           [1]   Medications Prior to Admission   Medication Sig Dispense Refill Last Dose/Taking    cetirizine 10 MG Oral Tab Take 1 tablet (10 mg total) by mouth daily.   1/9/2025 at  6:30 AM    cyclobenzaprine 10 MG Oral Tab Take 1 tablet (10 mg total) by mouth 3 (three) times daily.   1/9/2025 at  2:00 PM    amphetamine-dextroamphetamine 30 MG Oral Tab Take 1 tablet (30 mg total) by mouth 3 (three) times daily as needed.   1/9/2025 at  1:00 PM    ALPRAZolam 1 MG Oral Tab Take 1 tablet (1 mg total) by mouth 3 (three) times daily as needed.   1/9/2025 at  3:00 PM   [2]   Current Facility-Administered Medications Ordered in Epic   Medication Dose Route Frequency Provider Last Rate Last Admin    [Transfer Hold] mupirocin (Bactroban) 2% nasal ointment 1 Application  1 Application Each Nare BID Sony Alonso MD   1 Application at 01/10/25 0743    [Transfer Hold] acetaminophen (Tylenol Extra Strength) tab 500 mg  500 mg Oral Q4H PRN Jeannie Jacobson MD        [Transfer Hold] morphINE PF 2 MG/ML injection 1 mg  1 mg Intravenous Q2H PRN Jeannie Jacobson MD        Or    [Transfer Hold] morphINE PF 2 MG/ML injection 2 mg  2 mg Intravenous Q2H PRN Jeannie Jacobson MD        Or    [Transfer Hold] morphINE PF 4 MG/ML injection 4 mg  4 mg Intravenous Q2H PRN Jeannie Jacobson MD   4 mg at 01/10/25 0201    [Transfer Hold] acetaminophen (Tylenol) tab 650 mg  650 mg Oral Q4H PRN Jeannie Jacobson MD        Or    [Transfer Hold] HYDROcodone-acetaminophen (Norco) 5-325 MG per tab 1 tablet  1 tablet Oral Q4H PRN Jeannie Jacobson MD   1 tablet at 01/09/25 1940    Or    [Transfer Hold] HYDROcodone-acetaminophen (Norco) 5-325 MG per tab 2 tablet  2 tablet Oral Q4H PRN Jeannie Jacobson MD        [Transfer Hold] ondansetron (Zofran) 4 MG/2ML injection 4 mg  4 mg Intravenous Q6H PRN Jeannie Jacobson MD        [Transfer Hold] prochlorperazine (Compazine) 10 MG/2ML injection 5 mg  5 mg Intravenous Q8H  PRN Jeannie Jacobson MD        [Transfer Hold] temazepam (Restoril) cap 15 mg  15 mg Oral Nightly PRN Jeannie Jacobson MD        [Transfer Hold] ALPRAZolam (Xanax) tab 1 mg  1 mg Oral TID PRN Jeannie Jacobson MD   1 mg at 01/10/25 0650    [Transfer Hold] cyclobenzaprine (Flexeril) tab 10 mg  10 mg Oral TID Jeannie Jacobson MD   10 mg at 01/09/25 1940     No current Epic-ordered outpatient medications on file.   [3]   Allergies  Allergen Reactions    Seasonal ITCHING     Cockroach, trees, grass, dust mites

## 2025-01-10 NOTE — PLAN OF CARE
Problem: Patient Centered Care  Goal: Patient preferences are identified and integrated in the patient's plan of care  Description: Interventions:  - What would you like us to know as we care for you? Lives at home with family  - Provide timely, complete, and accurate information to patient/family  - Incorporate patient and family knowledge, values, beliefs, and cultural backgrounds into the planning and delivery of care  - Encourage patient/family to participate in care and decision-making at the level they choose  - Honor patient and family perspectives and choices  Outcome: Progressing     Problem: Patient/Family Goals  Goal: Patient/Family Short Term Goal  Description: Patient's Short Term Goal: surgery    Interventions:   - neuro surgeon on consult  - monitor vital signs  - monitor lab results  - administer pain medication as needed  - plan for surgery in am  - NPO after midnight  - See additional Care Plan goals for specific interventions  Outcome: Progressing     Problem: NEUROLOGICAL - ADULT  Goal: Achieves maximal functionality and self care  Description: INTERVENTIONS  - Monitor swallowing and airway patency with patient fatigue and changes in neurological status  - Encourage and assist patient to increase activity and self care with guidance from PT/OT  - Encourage visually impaired, hearing impaired and aphasic patients to use assistive/communication devices  Outcome: Progressing

## 2025-01-10 NOTE — ANESTHESIA PROCEDURE NOTES
Airway  Date/Time: 1/10/2025 8:32 AM  Urgency: elective    Airway not difficult    General Information and Staff    Patient location during procedure: OR  Anesthesiologist: Consuelo La MD  Resident/CRNA: Linda Kyle CRNA  Performed: CRNA   Performed by: Linda Kyle CRNA  Authorized by: Consuelo La MD      Indications and Patient Condition  Indications for airway management: airway protection and anesthesia  Spontaneous Ventilation: absent  Sedation level: deep  Preoxygenated: yes  Mask difficulty assessment: 0 - not attempted  Planned trial extubation    Final Airway Details  Final airway type: endotracheal airway      Successful airway: ETT  Cuffed: yes   Successful intubation technique: Video laryngoscopy  Endotracheal tube insertion site: oral  Blade: Sindhu  Blade size: #4  ETT size (mm): 7.5    Cormack-Lehane Classification: grade I - full view of glottis  Placement verified by: capnometry   Measured from: lips  ETT to lips (cm): 22  Number of attempts at approach: 1  Number of other approaches attempted: 0    Additional Comments  PATIENT HEAD AND NECK REMAINED NEUTRAL DURING INTUBATION WITH GLIDESCOPE

## 2025-01-10 NOTE — DISCHARGE INSTRUCTIONS
Discharge Instructions Dr. Sony Alonso MD  ANTERIOR CERVICAL DISCECTOMY AND FUSION (ACDF),  POSTERIOR CERVICAL FORAMINOTOMY,  POSTERIOR CERVICAL FUSION,  CERVICAL DISC REPLACEMENT (CDR)    Wound Care  Do not change or remove your initial postoperative dressing for the first 2 days after surgery   unless instructed to do so by Dr. Alonso's staff or if it is saturated by drainage.   If removed, please reapply a clean dry dressing over the incision.   You may have thin adhesive paper strips on your incision after surgery--please do NOT remove them. They will be removed at your 2-  week postoperative appointment.   You may remove your dressing and shower on the third day after surgery. Let warm soapy water run over the incisional area. Do not scrub the area.   Gently pat the incisional area completely dry.   You may reapply a clean dry dressing over the incision to prevent irritation from clothing, but this is not required after post op day 3.   You are not allowed to soak your incision in a bathtub, hot tub, or swimming pool until the incision is completely healed and Dr. Alonso or his staff permit these activities.   Do not apply antibiotic gels, ointments (Neosporin or bacitracin), lotions, peroxide or iodine solutions on or around the incision.  You may have some swelling and/or clear yellow drainage around your incisional site. This is   normal and may take several weeks to go away.   Please leave any superficial scabs alone and let them fall off on their own.    Immediate Follow Up  If you notice incisional redness or neck swelling, change in color to any drainage, malodor,   significant increased pain and/or a fever greater than 101.5, you should call our office. If it is   after business hours you should present to the closest emergency room. If you have shortness   of breath, chest pain, significant stomach pain and bloating without a bowel movement,   complete loss of bowel or bladder function please contact  our office and present to your local   emergency room as soon as possible.    Post-Operative Instructions - Cervical  After surgery you may experience pain in or around the region of the incision. Some neck and   arm pain as well as tingling or numbness may also be present. Initially it may be of greater   intensity than before surgery but will usually subside over time as the healing process occurs.   This discomfort is caused from surgical retraction of tissue as well as inflammation and swelling   of previously compressed nerves.  If you have had a cervical fusion, swallowing may be difficult for a while. This is from   manipulation of tissue and the presence of the breathing tube for anesthesia. The sore throat   will usually subside within a week. The swallowing difficulty may take slightly longer. Using   throat lozenges, sipping cool liquids, or sucking ice chips may soothe this pain. Initially drink   liquids and eat soft foods and progress as tolerated to well-cooked foods that are cut finely.  You may be given a hard and/or soft cervical collar. Please use the collar as instructed by Dr. Alonso. The collar should fit snuggly. Avoid any overhead activities.  Early activity after surgery is extremely important to help prevent the complications such as   pneumonia and blood clots. Activity also promotes recovery, relieves muscle stiffness, allows   for development of a well-organized scar, and improves your outcome. Your recovery is an   essential part of the surgical process so please follow the guidelines below to return to your   desired activities as safely as possible.     Week 1  ? Try to get at least 8 hours of sleep each night. A disrupted sleep pattern is common   after discharge from the hospital and will return to normal over time.  ? Avoid bending and twisting at the neck and practice good posture.   ? If you have a neck brace or collar, please wear it when you are up walking or sitting   unless  otherwise instructed. You do not need to wear it to bed.   ? No lifting of anything.  ? You may not drive, but you may be driven.  ? Begin a daily walking program with 1-2 blocks initially; schedule a daily time and   increase distance daily. Ideally, we would like you to be up and walking 15   minutes/hour.   ? Eat a balanced high-fiber diet and drink plenty of water.  ? Take medications as prescribed, using narcotics and muscle relaxants only as needed.  ? Do not restart any NSAIDs such as Advil, Motrin, Aleve, ibuprofen, naproxen, diclofenac,   meloxicam or celecoxib for at least 3 months after your fusion surgery.   ? Take stool softeners to help with bowel movements.    Week 2  ? Resume normal rising and retiring schedule but continue to rest throughout the day as   needed.  ? You may not drive, but you may be driven.  ? No lifting of anything weighing more than 10 to 15 pounds.  ? Continue scheduled walking, increasing distance and frequency as tolerated.  ? May resume sexual relations when comfortable between 2 to 4 weeks after surgery.  Please be advised the patient must be in the top position until 6 weeks after surgery.   ? Begin weaning from narcotic pain medications if you have not already.  ? Follow-up in the office as scheduled for further instructions.     Disability  The usual period of recovery for cervical spine surgery is 8 to 12 weeks and complete healing   may take from 6 to 9 months. Some patients may return to work sooner than others   depending on their job, response to surgery, and ability to perform other lighter tasks in the   workplace. Physician approval is required prior to returning to work.    Post-Operative Pain Medication Policy  It is Dr. Alonso's aim to make you as comfortable as possible after surgery. We realize pain   management is not perfect, and that you will have some discomfort after your operation.   There are several factors that limit our ability to completely eliminate  pain after surgery. This   first is that pain medications have side effects. Please be advised that high doses or continued   use of narcotic medications may put you at risk for serious health issues including but not   limited to respiratory depression (decreased ability to breathe normally), hypotension (low   blood pressure), nausea and constipation, generalized itching, urinary retention (inability to   urinate) and abdominal distention. Dr. Alonso will prescribe pain medication for 2-3 weeks after   surgery and may refer you out to a pain management specialist for any narcotic medication   requested after this period of time.    Preventing Opioid Addiction  [facility] is committed to protecting our patients from Opioid addiction.   We only prescribe opioids when necessary. Whenever possible, we use less-addictive pain   medication and alternative pain management options. Both high-dosage opioids and lowdosage opioids taken over long periods of time can increase the risk of addiction. We attempt to limit our prescriptions of opioids after surgery as much as possible, which may include lower   dosages of opioid medications or fewer pills. If you have additional questions about Opioids   and their dependency, please contact our clinic.    Contact Information  Please contact Dr. Gavin Sheppard’s , at [phone] with any   questions or concerns pertaining to scheduling or other administrative issues.  Please contact Dr. Gavin Skelton PA-C’s physician assistant, at [phone] with   any medical questions or concerns.     AFTER HOURS EMERGENCY CONTACT: Please dial [phone] and press “0”  for the  to connect you to the orthopaedic fellow or resident on call if you have any   urgent questions or concerns after regular business hours. If you do not hear back from the oncall physician in a timely matter and your urgent matter turns emergent, you should  present to   your local emergency room. Please follow-up with Ivette Shields PA-C the following business   day with an update if you do have to contact the on-call physician or present to your local   emergency room after surgery.     Future Dental Appointments (Applies to fusion and CDR surgery only)  (3 Months following surgery): Prior to going to the dentist, please notify Dr. Alonso and let your   Dentist know that you had a spinal fusion surgery. Have the treating physician prescribe   antibiotics prior to the procedure. The appropriate medications and guidelines are as follows:  ? Amoxicillin 2 grams 1 hour prior to procedure. If allergic to Penicillin, Clindamycin 600   mg, 1 hour prior to procedure. No second doses are required following the dental   procedure.      HOME INSTRUCTIONS  [facility] HOME CARE INSTRUCTIONS: POST-OP ANESTHESIA  The medication that you received for sedation or general anesthesia can last up to 24 hours. Your judgment and reflexes may be altered, even if you feel like your normal self.      We Recommend:   Do not drive any motor vehicle or bicycle   Avoid mowing the lawn, playing sports, or working with power tools/applicances (power saws, electric knives or mixers)   That you have someone stay with you on your first night home   Do not drink alcohol or take sleeping pills or tranquilizers   Do not sign legal documents within 24 hours of your procedure   If you had a nerve block for your surgery, take extra care not to put any pressure on your arm or hand for 24 hours    It is normal:  For you to have a sore throat if you had a breathing tube during surgery (while you were asleep!). The sore throat should get better within 48 hours. You can gargle with warm salt water (1/2 tsp in 4 oz warm water) or use a throat lozenge for comfort  To feel muscle aches or soreness especially in the abdomen, chest or neck. The achy feeling should go away in the next 24 hours  To feel weak, sleepy or  \"wiped out\". Your should start feeling better in the next 24 hours.   To experience mild discomforts such as sore lip or tongue, headache, cramps, gas pains or a bloated feeling in your abdomen.   To experience mild back pain or soreness for a day or two if you had spinal or epidural anesthesia.   If you had laparoscopic surgery, to feel shoulder pain or discomfort on the day of surgery.   For some patients to have nausea after surgery/anesthesia    If you feel nausea or experience vomiting:   Try to move around less.   Eat less than usual or drink only liquids until the next morning   Nausea should resolve in about 24 hours    If you have a problem when you are at home:    Call your surgeons office       Discharge Instructions: After Your Surgery  You’ve just had surgery. During surgery, you were given medicine called anesthesia to keep you relaxed and free of pain. After surgery, you may have some pain or nausea. This is common. Here are some tips for feeling better and getting well after surgery.   Going home  Your healthcare provider will show you how to take care of yourself when you go home. They'll also answer your questions. Have an adult family member or friend drive you home. For the first 24 hours after your surgery:   Don't drive or use heavy equipment.  Don't make important decisions or sign legal papers.  Take medicines as directed.  Don't drink alcohol.  Have someone stay with you, if needed. They can watch for problems and help keep you safe.  Be sure to go to all follow-up visits with your healthcare provider. And rest after your surgery for as long as your provider tells you to.   Coping with pain  If you have pain after surgery, pain medicine will help you feel better. Take it as directed, before pain becomes severe. Also, ask your healthcare provider or pharmacist about other ways to control pain. This might be with heat, ice, or relaxation. And follow any other instructions your surgeon or nurse  gives you.      Stay on schedule with your medicine.     Tips for taking pain medicine  To get the best relief possible, remember these points:   Pain medicines can upset your stomach. Taking them with a little food may help.  Most pain relievers taken by mouth need at least 20 to 30 minutes to start to work.  Don't wait till your pain becomes severe before you take your medicine. Try to time your medicine so that you can take it before starting an activity. This might be before you get dressed, go for a walk, or sit down for dinner.  Constipation is a common side effect of some pain medicines. Call your healthcare provider before taking any medicines such as laxatives or stool softeners to help ease constipation. Also ask if you should skip any foods. Drinking lots of fluids and eating foods such as fruits and vegetables that are high in fiber can also help. Remember, don't take laxatives unless your surgeon has prescribed them.  Drinking alcohol and taking pain medicine can cause dizziness and slow your breathing. It can even be deadly. Don't drink alcohol while taking pain medicine.  Pain medicine can make you react more slowly to things. Don't drive or run machinery while taking pain medicine.  Your healthcare provider may tell you to take acetaminophen to help ease your pain. Ask them how much you're supposed to take each day. Acetaminophen or other pain relievers may interact with your prescription medicines or other over-the-counter (OTC) medicines. Some prescription medicines have acetaminophen and other ingredients in them. Using both prescription and OTC acetaminophen for pain can cause you to accidentally overdose. Read the labels on your OTC medicines with care. This will help you to clearly know the list of ingredients, how much to take, and any warnings. It may also help you not take too much acetaminophen. If you have questions or don't understand the information, ask your pharmacist or healthcare  provider to explain it to you before you take the OTC medicine.   Managing nausea  Some people have an upset stomach (nausea) after surgery. This is often because of anesthesia, pain, or pain medicine, less movement of food in the stomach, or the stress of surgery. These tips will help you handle nausea and eat healthy foods as you get better. If you were on a special food plan before surgery, ask your healthcare provider if you should follow it while you get better. Check with your provider on how your eating should progress. It may depend on the surgery you had. These general tips may help:   Don't push yourself to eat. Your body will tell you when to eat and how much.  Start off with clear liquids and soup. They're easier to digest.  Next try semi-solid foods as you feel ready. These include mashed potatoes, applesauce, and gelatin.  Slowly move to solid foods. Don’t eat fatty, rich, or spicy foods at first.  Don't force yourself to have 3 large meals a day. Instead eat smaller amounts more often.  Take pain medicines with a small amount of solid food, such as crackers or toast. This helps prevent nausea.  When to call your healthcare provider  Call your healthcare provider right away if you have any of these:   You still have too much pain, or the pain gets worse, after taking the medicine. The medicine may not be strong enough. Or there may be a complication from the surgery.  You feel too sleepy, dizzy, or groggy. The medicine may be too strong.  Side effects such as nausea or vomiting. Your healthcare provider may advise taking other medicines to .  Skin changes such as rash, itching, or hives. This may mean you have an allergic reaction. Your provider may advise taking other medicines.  The incision looks different (for instance, part of it opens up).  Bleeding or fluid leaking from the incision site, and weren't told to expect that.  Fever of 100.4°F (38°C) or higher, or as directed by your provider.  Call  911  Call 911 right away if you have:   Trouble breathing  Facial swelling    If you have obstructive sleep apnea   You were given anesthesia medicine during surgery to keep you comfortable and free of pain. After surgery, you may have more apnea spells because of this medicine and other medicines you were given. The spells may last longer than normal.    At home:  Keep using the continuous positive airway pressure (CPAP) device when you sleep. Unless your healthcare provider tells you not to, use it when you sleep, day or night. CPAP is a common device used to treat obstructive sleep apnea.  Talk with your provider before taking any pain medicine, muscle relaxants, or sedatives. Your provider will tell you about the possible dangers of taking these medicines.  Contact your provider if your sleeping changes a lot even when taking medicines as directed.  Brenna last reviewed this educational content on 10/1/2021  © 9365-3369 [employer]. All rights reserved. This information is not intended as a substitute for professional medical care. Always follow your healthcare professional's instructions.

## 2025-01-10 NOTE — PLAN OF CARE
Problem: Patient Centered Care  Goal: Patient preferences are identified and integrated in the patient's plan of care  Description: Interventions:  - What would you like us to know as we care for you? Lives at home with family  - Provide timely, complete, and accurate information to patient/family  - Incorporate patient and family knowledge, values, beliefs, and cultural backgrounds into the planning and delivery of care  - Encourage patient/family to participate in care and decision-making at the level they choose  - Honor patient and family perspectives and choices  Outcome: Adequate for Discharge     Problem: Patient/Family Goals  Goal: Patient/Family Long Term Goal  Description: Patient's Long Term Goal: go home    Interventions:  - neuro surgeon on consult  - monitor vital signs  - monitor lab results  - plan for surgery in am  - See additional Care Plan goals for specific interventions  Outcome: Adequate for Discharge  Goal: Patient/Family Short Term Goal  Description: Patient's Short Term Goal: surgery    Interventions:   - neuro surgeon on consult  - monitor vital signs  - monitor lab results  - administer pain medication as needed  - plan for surgery in am  - NPO after midnight  - See additional Care Plan goals for specific interventions  Outcome: Adequate for Discharge     Problem: NEUROLOGICAL - ADULT  Goal: Achieves maximal functionality and self care  Description: INTERVENTIONS  - Monitor swallowing and airway patency with patient fatigue and changes in neurological status  - Encourage and assist patient to increase activity and self care with guidance from PT/OT  - Encourage visually impaired, hearing impaired and aphasic patients to use assistive/communication devices  Outcome: Adequate for Discharge  Pt okay to be discharged per surgeon and hospitalist order, hemovac drain removed by certified nurse per order and tolerated well, pt instructed in post op instructions-all questions answered, pt  assisted to main entrance via wheelchair by this RN with all belongings and picked up by family, pt stable at time of discharge.

## 2025-01-10 NOTE — PAYOR COMM NOTE
--------------  ADMISSION REVIEW     Payor: [employer] [facility]  Subscriber #:  [id number]  Authorization Number: [id number]    Admit date: 1/9/25  Admit time:  4:58 PM       REVIEW DOCUMENTATION:     ED Provider Notes        ED Provider Notes signed by Dieudonne Hutton MD at 1/9/2025  4:35 PM       Author: Dieudonne Hutton MD Service: -- Author Type: Physician    Filed: 1/9/2025  4:35 PM Date of Service: 1/9/2025  3:44 PM Status: Signed    : Dieudonne Hutton MD (Physician)           Patient Seen in: [facility] Emergency Department    History     Chief Complaint   Patient presents with    Pain       HPI    29-year-old male who was brought to the emergency department given plan for admission for cervical surgery tomorrow, reports several months of right paresthesias and weakness at the right upper extremity.  Has a history of cerebral palsy as well.  Had MRI 2 days ago.    History reviewed.   Past Medical History:    Anxiety    Back pain    Back problem    CP (cerebral palsy) (HCC)    ANA (generalized anxiety disorder)    Neuropathy    PONV (postoperative nausea and vomiting)       History reviewed.   Past Surgical History:   Procedure Laterality Date    Adenoidectomy      Appendectomy      Spine surgery procedure unlisted  11/22/2024    lumbar         Medications :  Prescriptions Prior to Admission[1]     Family History   Problem Relation Age of Onset    Diabetes Mother        Smoking Status:   Social History     Socioeconomic History    Marital status: Single   Tobacco Use    Smoking status: Never    Smokeless tobacco: Never   Vaping Use    Vaping status: Never Used   Substance and Sexual Activity    Alcohol use: Yes     Comment: 2 x weekly    Drug use: Never   Other Topics Concern    Caffeine Concern Yes     Comment: 1 cup coffee daily    Exercise Yes     Comment: Iman       Constitutional and vital signs reviewed.      Social History and Family History elements reviewed from today, pertinent  positives to the presenting problem noted.    Physical Exam     ED Triage Vitals [01/09/25 1445]   /81   Pulse 101   Resp 20   Temp 97.7 °F (36.5 °C)   Temp src Temporal   SpO2 100 %   O2 Device None (Room air)       All measures to prevent infection transmission during my interaction with the patient were taken. The patient was already wearing a droplet mask on my arrival to the room. Personal protective equipment was worn throughout the duration of the exam.  Handwashing was performed prior to and after the exam.  Stethoscope and any equipment used during my examination was cleaned with super sani-cloth germicidal wipes following the exam.     Physical Exam    General: NAD  Head: Normocephalic and atraumatic.  Mouth/Throat/Ears/Nose: No hoarseness of voice  Eyes: Conjunctivae and EOM are normal.  Neck: Normal range of motion. Supple.   Cardiovascular: Normal rate  Respiratory/Chest: No tachypnea.  Gastrointestinal: Nondistended  Musculoskeletal:No swelling or deformity.   Neurological: Alert and appropriate.  Right hand 4+ strength, 2+ radial pulses at the right hand  Skin: Skin is warm and dry. No pallor.  Psychiatric: Has a normal mood and affect.      ED Course        Labs Reviewed   CBC WITH DIFFERENTIAL WITH PLATELET - Abnormal; Notable for the following components:       Result Value    WBC 12.3 (*)     Neutrophil Absolute Prelim 9.41 (*)     Neutrophil Absolute 9.41 (*)     All other components within normal limits   BASIC METABOLIC PANEL (8) - Normal       As Interpreted by me    Imaging Results Available and Reviewed while in ED: No results found.  ED Medications Administered:   Medications   morphINE PF 4 MG/ML injection 4 mg (4 mg Intravenous Given 1/9/25 1607)   ALPRAZolam (Xanax) tab 1 mg (1 mg Oral Given 1/9/25 1620)         MDM     Vitals:    01/09/25 1445 01/09/25 1516   BP: 132/81 99/64   Pulse: 101 107   Resp: 20 22   Temp: 97.7 °F (36.5 °C)    TempSrc: Temporal    SpO2: 100% 100%     *I  personally reviewed and interpreted all ED vitals.    Pulse Ox: 100%, Room air, Normal     Medical Decision Making      Differential Diagnosis/ Diagnostic Considerations: Cervical myelopathy    Complicating Factors: The patient already has cerebral  palsy to contribute to the complexity of this ED evaluation.    I reviewed prior chart records including ED note from November 10, 2024 as well as H&P from earlier today.  Patient is admitted for cervical surgery tomorrow.  Analgesia ordered.  Admitted to and discussed with . Labs unremarkable.     Admitted In stable condition.  Patient is comfortable with the plan.         Disposition and Plan     Clinical Impression:  1. Cervical myelopathy (HCC)        Disposition:  Admit    Follow-up:  No follow-up provider specified.    Medications Prescribed:  Current Discharge Medication List          Hospital Problems       Present on Admission  Date Reviewed: 8/7/2023            ICD-10-CM Noted POA    Cervical myelopathy (HCC) G95.9 1/9/2025 Unknown                    Signed by Dieudonne Hutton MD on 1/9/2025  4:35 PM         MEDICATIONS ADMINISTERED IN LAST 1 DAY:  ALPRAZolam (Xanax) tab 1 mg       Date Action Dose Route User    1/9/2025 1620 Given 1 mg Oral Christine Patton RN          ALPRAZolam (Xanax) tab 1 mg       Date Action Dose Route User    1/10/2025 0650 Given 1 mg Oral Philipp Barreto RN          bupivacaine PF (Marcaine) 0.5% injection       Date Action Dose Route User    1/10/2025 0905 Given 10 mL (none) (Back) Sony Alonso MD          ceFAZolin (Ancef) 2g in 10mL IV syringe premix       Date Action Dose Route User    1/10/2025 0833 Given 2 g Intravenous Linda Kyle CRNA          cyclobenzaprine (Flexeril) tab 10 mg       Date Action Dose Route User    1/9/2025 1940 Given 10 mg Oral Philipp Barreto, RANULFO          dexamethasone (Decadron) 4 MG/ML injection       Date Action Dose Route User    1/10/2025 0838 Given 10 mg Intravenous Linda Kyle  TED, CRNA          fentaNYL (Sublimaze) 50 mcg/mL injection       Date Action Dose Route User    1/10/2025 0855 Given 50 mcg Intravenous Linda Kyle, CRNA    1/10/2025 0830 Given 50 mcg Intravenous Linda Kyle, CRNA          glycopyrrolate (Robinul) 0.2 MG/ML injection       Date Action Dose Route User    1/10/2025 0819 Given 0.2 mg Intravenous Linda Kyle, CRNA          HYDROcodone-acetaminophen (Norco) 5-325 MG per tab 1 tablet       Date Action Dose Route User    1/9/2025 1940 Given 1 tablet Oral Philipp Barreto RN          HYDROmorphone (Dilaudid) 1 MG/ML injection       Date Action Dose Route User    1/10/2025 1003 Given 0.3 mg Intravenous Linda Kyle CRNA    1/10/2025 0956 Given 0.2 mg Intravenous Linda Kyle, CRNA          lidocaine PF (Xylocaine-MPF) 1% injection       Date Action Dose Route User    1/10/2025 0830 Given 50 mg Intravenous Linda Kyle CRNA          midazolam (Versed) 2 MG/2ML injection       Date Action Dose Route User    1/10/2025 0819 Given 2 mg Intravenous Linda Kyle CRNA          morphINE PF 4 MG/ML injection 4 mg       Date Action Dose Route User    1/9/2025 1607 Given 4 mg Intravenous Christine Patton RN          morphINE PF 4 MG/ML injection 4 mg       Date Action Dose Route User    1/10/2025 0201 Given 4 mg Intravenous Philipp Barreto RN    1/9/2025 2046 Given 4 mg Intravenous Philipp Barreto RN          mupirocin (Bactroban) 2% nasal ointment 1 Application       Date Action Dose Route User    1/10/2025 0743 Given 1 Application Each Nare Yashira Durham, RANULFO          ondansetron (Zofran) 4 MG/2ML injection       Date Action Dose Route User    1/10/2025 1010 Given 8 mg Intravenous Linda Kyle CRNA          phenylephrine (William-Synephrine) 10 MG/ML injection       Date Action Dose Route User    1/10/2025 0908 Given 100 mcg Intravenous Linda Kyle CRNA          propofol (Diprivan) 10 MG/ML injection       Date Action Dose Route User    1/10/2025  0857 Given 30 mg Intravenous Saroj Linda D, CRNA    1/10/2025 0830 Given 200 mg Intravenous Saroj Linda D, CRNA          propofol (Diprivan) 10 mg/mL infusion premix       Date Action Dose Route User    1/10/2025 0857 Rate/Dose Change 150 mcg/kg/min × 92.3 kg Intravenous Saroj Linda D, CRNA    1/10/2025 0833 New Bag 130 mcg/kg/min × 92.3 kg Intravenous Linda Kyle D, CRNA          rocuronium (Zemuron) 50 mg/5mL injection       Date Action Dose Route User    1/10/2025 0947 Given 15 mg Intravenous Saroj Linda D, CRNA    1/10/2025 0925 Given 15 mg Intravenous Saroj Linda D, CRNA    1/10/2025 0858 Given 60 mg Intravenous Saroj Linda D, CRNA          succinylcholine (Anectine) 20 MG/ML injection       Date Action Dose Route User    1/10/2025 0831 Given 140 mg Intravenous Saroj Linda D, CRNA          sugammadex (Bridion) 200 MG/2ML injection       Date Action Dose Route User    1/10/2025 1013 Given 360 mg Intravenous Saroj Linda TED, CRNA          thrombin (Thrombin-JMI) 5000 units topical solution       Date Action Dose Route User    1/10/2025 0923 Given 20,000 Units Topical (Neck) Sony Alonso MD            Vitals (last day)       Date/Time Temp Pulse Resp BP SpO2 Weight O2 Device O2 Flow Rate (L/min) [facility]    01/10/25 1404 -- 72 16 110/61 95 % -- None (Room air) --     01/10/25 1154 -- 83 14 114/73 94 % -- None (Room air) --     01/10/25 1120 98.2 °F (36.8 °C) 68 14 112/75 96 % -- None (Room air) --     01/10/25 1112 97 °F (36.1 °C) 70 18 -- 99 % -- -- -- MT    01/10/25 1112 -- -- -- 107/80 -- -- Nasal cannula 1 L/min     01/10/25 1110 -- 81 13 107/80 99 % -- Nasal cannula 2 L/min     01/10/25 1100 -- 75 11 111/78 99 % -- Nasal cannula 2 L/min     01/10/25 1054 97 °F (36.1 °C) -- -- -- -- -- -- -- MT    01/10/25 1050 -- 68 10 127/76 99 % -- Nasal cannula 2 L/min     01/10/25 1040 98.1 °F (36.7 °C) 87 11 126/84 93 % -- None (Room air) --     01/10/25 0732 -- 67 18 111/59 99 % --  None (Room air) -- NT    01/10/25 0451 -- -- -- 103/56 -- -- -- -- LV    01/10/25 0441 97.7 °F (36.5 °C) 60 16 99/67 98 % -- None (Room air) -- LV    01/09/25 1916 98.8 °F (37.1 °C) 72 18 119/72 95 % -- None (Room air) -- LV    01/09/25 1708 98.7 °F (37.1 °C) 82 20 118/78 99 % 203 lb 8 oz (92.3 kg) None (Room air) -- MM    01/09/25 1516 -- 107 22 99/64 100 % -- None (Room air) -- EH    01/09/25 1445 97.7 °F (36.5 °C) 101 20 132/81 100 % -- None (Room air) -- SL         1/09/2025 &[facility]     PATIENT'S NAME: MARILUSHIELA SMITH   ATTENDING PHYSICIAN: Jeannie Jacobson MD   PATIENT ACCOUNT#:   [id number]    LOCATION:  [address] [place] [address]  MEDICAL RECORD #:   [id number]       YOB: 1995  ADMISSION DATE:       01/09/2025     HISTORY AND PHYSICAL EXAMINATION     CHIEF COMPLAINT:  Cervical spinal stenosis with radiculopathy and myelopathy.     HISTORY OF PRESENT ILLNESS:  Patient is a 29-year-old  male with chronic and progressive right-sided neck pain radiating to his right upper extremity.  He had an MRI scan of the cervical spine done on January 7, which showed multilevel degenerative joint disease of the cervical spine with lordotic reversal at C4-C5 level.  There is moderate spinal canal stenosis and indentation of the ventral cord's contour with severe right greater than left foraminal narrowing.  Patient came in today to the emergency department for evaluation.  He is scheduled tomorrow for anterior cervical discectomy and fusion by Dr. Sony Alonso.       PAST MEDICAL HISTORY:  Anxiety and attention deficit disorder, degenerative joint disease of cervical and lumbar spine.     PAST SURGICAL HISTORY:  Lumbar laminectomy, L4-L5.  Also history of appendectomy and adenoidectomy.     MEDICATIONS:  Please see medication reconciliation list.      FAMILY HISTORY:  Mother had diabetes mellitus type 2.     SOCIAL HISTORY:  No tobacco or drug use.  Occasional alcohol.  Lives with his family.   Independent in his basic activities of daily living.      REVIEW OF SYSTEMS:  Intense pain radiates from the neck to the shoulder and arm/forearm involving the right second, third and fourth digits with sometimes weak handgrip.  Pain gets worse with neck movement.  No recent trauma.  Other 12-point review of systems is negative.        PHYSICAL EXAMINATION:    GENERAL:  Alert and oriented to time, place and person.  Mild to moderate distress.   VITAL SIGNS:  Temperature 97.7, pulse 107, respiratory rate 22, blood pressure 99/64, pulse ox 100% on room air.   HEENT:  Atraumatic.  Oropharynx clear.  Moist mucous membranes.  Ears and nose normal.  Eyes:  Anicteric sclerae.   NECK:  Supple.  No lymphadenopathy.  Trachea midline.  Full range of motion.  Right neck torticollis toward the left to compensate for right shoulder and right upper extremity pain.  LUNGS:  Clear to auscultation bilaterally.  Normal respiratory effort.    HEART:  Regular rate and rhythm.  S1 and S2 auscultated.  No murmur.    ABDOMEN:  Soft, nondistended.  No tenderness.  Positive bowel sounds.   EXTREMITIES:  No peripheral edema, clubbing or cyanosis.      ASSESSMENT:  Cervical spinal stenosis with radiculopathy and myelopathy.       PLAN:  Patient will be admitted to general medical floor.  Pain control.  N.p.o. after midnight.  Orthopedic spine surgery consult.  Anterior cervical discectomy and fusion procedure scheduled for tomorrow morning.  Further recommendations to follow.      Dictated By Jeannie Jacobson MD  d:2025 16:29:24      1/10/2025 Hospitalist Progress Note   [facility]  part of [facility]     Progress Note           Cesar Llanos Patient Status:  Inpatient    1995 MRN [id number]   Location [facility] OPERATING ROOM Attending Sherman Jaime MD   Hosp Day # 1 PCP Lea Dorantes, DO         Subjective:   Cesar Llanos is ready for the surgery this morning.     Review of Systems:   10  point ROS completed and was negative, except for pertinent positive and negatives stated in subjective.     Objective:      Vitals          Vitals:     01/09/25 1916 01/10/25 0441 01/10/25 0451 01/10/25 0732   BP: 119/72 99/67 103/56 111/59   BP Location: Right arm Right arm Left arm Left arm   Pulse: 72 60   67   Resp: 18 16   18   Temp: 98.8 °F (37.1 °C) 97.7 °F (36.5 °C)       TempSrc: Oral Oral       SpO2: 95% 98%   99%   Weight:           Height:                 Patient Weight for the past 72 hrs:    Weight   01/09/25 1708 203 lb 8 oz (92.3 kg)         Intake/Output Summary (Last 24 hours) at 1/10/2025 0916  Last data filed at 1/9/2025 1840      Gross per 24 hour   Intake 340 ml   Output --   Net 340 ml         GENERAL:  The patient appeared to be in no distress  SKIN:  Warm and hydrated  HEENT:  Head was atraumatic and normocephalic.   CHEST:  Symmetrical movement on inspiration  LUNGS:  No audible wheezing  ABDOMEN: Non-distended  MUSCULOSKELETAL:  There was no deformity.    EXTREMITIES: There was no edema  NEUROLOGICAL:  There was no focal deficit.    PSYCHIATRIC: Calm and cooperative      Current Scheduled Inpatient Meds:      Scheduled Medications    [Transfer Hold] mupirocin  1 Application Each Nare BID    [Transfer Hold] cyclobenzaprine  10 mg Oral TID            Current PRN Inpatient Meds:        PRN Medications     [Transfer Hold] acetaminophen    [Transfer Hold] morphINE **OR** [Transfer Hold] morphINE **OR** [Transfer Hold] morphINE    [Transfer Hold] acetaminophen **OR** [Transfer Hold] HYDROcodone-acetaminophen **OR** [Transfer Hold] HYDROcodone-acetaminophen    [Transfer Hold] ondansetron    [Transfer Hold] prochlorperazine    [Transfer Hold] temazepam    [Transfer Hold] ALPRAZolam        Results:            Lab Results   Component Value Date     WBC 8.4 01/10/2025     HGB 16.2 01/10/2025     HCT 46.2 01/10/2025     .0 01/10/2025     CREATSERUM 1.01 01/10/2025     BUN 16 01/10/2025     NA  141 01/10/2025     K 4.7 01/10/2025      01/10/2025     CO2 31.0 01/10/2025     GLU 95 01/10/2025     CA 10.1 01/10/2025     ALB 4.9 (H) 12/17/2024     ALKPHO 74 12/17/2024     BILT 0.5 12/17/2024     TP 7.6 12/17/2024     AST 28 12/17/2024     ALT 38 12/17/2024              Recent Labs   Lab 01/09/25  1602 01/10/25  0646   RBC 4.96 4.91   HGB 16.4 16.2   HCT 44.8 46.2   MCV 90.3 94.1   MCH 33.1 33.0   MCHC 36.6 35.1   RDW 12.0 12.3   NEPRELIM 9.41* 5.22   WBC 12.3* 8.4   .0 213.0           Recent Labs   Lab 01/09/25  1602 01/10/25  0646   GLU 96 95   BUN 17 16   CREATSERUM 1.01 1.01   CA 9.9 10.1    141   K 4.0 4.7    104   CO2 28.0 31.0      No results found for: \"PT\", \"INR\"     Culture:  No results found for this visit on 01/09/25.     Imaging/EKG:   No results found.      Assessment and Plan:   Patient is a 29-year-old  male with progressive right-sided neck pain radiating to the right upper extremity, who presents with worsening symptoms.     # Cervical spinal stenosis with radiculopathy and myelopathy   - MRI 1/7/25 shoed moderate spinal canal stenosis at C4-C5, indentation of the ventral cord contour, and severe right greater left foraminal narrowing.   - He is scheduled tomorrow for anterior cervical discectomy and fusion by Dr. Sony Alonso.      Diet: NPO for surgery  PT/OT: deferred  DVT ppx: SCD  Code status: Full  Dispo: per clinical course     MDM: high Sherman Jaime MD, PhD         [1] (Not in a hospital admission)

## 2025-01-10 NOTE — PROGRESS NOTES
Last appointment: 5/6/2021  Next appointment: 8/6/2021  Last refill: 01/21/2019 [facility]  part of [facility]    Progress Note    Cesar Llanos Patient Status:  Inpatient    1995 MRN [id number]   Location [facility] OPERATING ROOM Attending Sherman Jaime MD   Hosp Day # 1 PCP Lea Dorantes,        Subjective:   Cesar Llanos is ready for the surgery this morning.    Review of Systems:   10 point ROS completed and was negative, except for pertinent positive and negatives stated in subjective.    Objective:     Vitals:    25 1916 01/10/25 0441 01/10/25 0451 01/10/25 0732   BP: 119/72 99/67 103/56 111/59   BP Location: Right arm Right arm Left arm Left arm   Pulse: 72 60  67   Resp: 18 16  18   Temp: 98.8 °F (37.1 °C) 97.7 °F (36.5 °C)     TempSrc: Oral Oral     SpO2: 95% 98%  99%   Weight:       Height:         Patient Weight for the past 72 hrs:   Weight   25 1708 203 lb 8 oz (92.3 kg)       Intake/Output Summary (Last 24 hours) at 1/10/2025 0916  Last data filed at 2025 1840  Gross per 24 hour   Intake 340 ml   Output --   Net 340 ml       GENERAL:  The patient appeared to be in no distress  SKIN:  Warm and hydrated  HEENT:  Head was atraumatic and normocephalic.   CHEST:  Symmetrical movement on inspiration  LUNGS:  No audible wheezing  ABDOMEN: Non-distended  MUSCULOSKELETAL:  There was no deformity.    EXTREMITIES: There was no edema  NEUROLOGICAL:  There was no focal deficit.    PSYCHIATRIC: Calm and cooperative     Current Scheduled Inpatient Meds:      [Transfer Hold] mupirocin  1 Application Each Nare BID    [Transfer Hold] cyclobenzaprine  10 mg Oral TID       Current PRN Inpatient Meds:        [Transfer Hold] acetaminophen    [Transfer Hold] morphINE **OR** [Transfer Hold] morphINE **OR** [Transfer Hold] morphINE    [Transfer Hold] acetaminophen **OR** [Transfer Hold] HYDROcodone-acetaminophen **OR** [Transfer Hold] HYDROcodone-acetaminophen    [Transfer Hold] ondansetron    [Transfer Hold] prochlorperazine    [Transfer  Hold] temazepam    [Transfer Hold] ALPRAZolam    Results:     Lab Results   Component Value Date    WBC 8.4 01/10/2025    HGB 16.2 01/10/2025    HCT 46.2 01/10/2025    .0 01/10/2025    CREATSERUM 1.01 01/10/2025    BUN 16 01/10/2025     01/10/2025    K 4.7 01/10/2025     01/10/2025    CO2 31.0 01/10/2025    GLU 95 01/10/2025    CA 10.1 01/10/2025    ALB 4.9 (H) 12/17/2024    ALKPHO 74 12/17/2024    BILT 0.5 12/17/2024    TP 7.6 12/17/2024    AST 28 12/17/2024    ALT 38 12/17/2024       Recent Labs   Lab 01/09/25  1602 01/10/25  0646   RBC 4.96 4.91   HGB 16.4 16.2   HCT 44.8 46.2   MCV 90.3 94.1   MCH 33.1 33.0   MCHC 36.6 35.1   RDW 12.0 12.3   NEPRELIM 9.41* 5.22   WBC 12.3* 8.4   .0 213.0     Recent Labs   Lab 01/09/25  1602 01/10/25  0646   GLU 96 95   BUN 17 16   CREATSERUM 1.01 1.01   CA 9.9 10.1    141   K 4.0 4.7    104   CO2 28.0 31.0     No results found for: \"PT\", \"INR\"    Culture:  No results found for this visit on 01/09/25.    Imaging/EKG:   No results found.      Assessment and Plan:   Patient is a 29-year-old  male with progressive right-sided neck pain radiating to the right upper extremity, who presents with worsening symptoms.    # Cervical spinal stenosis with radiculopathy and myelopathy   - MRI 1/7/25 shoed moderate spinal canal stenosis at C4-C5, indentation of the ventral cord contour, and severe right greater left foraminal narrowing.   - He is scheduled tomorrow for anterior cervical discectomy and fusion by Dr. Sony Alonso.     Diet: NPO for surgery  PT/OT: deferred  DVT ppx: SCD  Code status: Full  Dispo: per clinical course    MDM: high Sherman Jaime MD, PhD  Message via Secure Chat  [facility]     Performed Resulted

## 2025-01-10 NOTE — DISCHARGE SUMMARY
[facility]  part of [facility]    Discharge Summary    Cesar Llanos Patient Status:  Inpatient    1995 MRN [id number]   Location [facility] Attending No att. providers found   Hosp Day # 1 PCP Lea Dorantes DO     Date of Admission: 2025     Date of Discharge: 01/10/25     Hospital Discharge Diagnoses:  Cervical spinal stenosis with radiculopathy and myelopathy     Lace+ Score: 28  59-90 High Risk  29-58 Medium Risk  0-28   Low Risk.    TCM Follow-Up Recommendation:  LACE < 29: Low Risk of readmission after discharge from the hospital. No TCM follow-up needed.    HPI Per Admitting Physician: Patient is a 29-year-old  male with chronic and progressive right-sided neck pain radiating to his right upper extremity. He had an MRI scan of the cervical spine done on , which showed multilevel degenerative joint disease of the cervical spine with lordotic reversal at C4-C5 level. There is moderate spinal canal stenosis and indentation of the ventral cord's contour with severe right greater than left foraminal narrowing. Patient came in today to the emergency department for evaluation. He is scheduled tomorrow for anterior cervical discectomy and fusion by Dr. Sony Alonso.      Hospital Course:      # Cervical spinal stenosis with radiculopathy and myelopathy   - MRI 25 shoed moderate spinal canal stenosis at C4-C5, indentation of the ventral cord contour, and severe right greater left foraminal narrowing.   - He is scheduled or anterior cervical discectomy and fusion by Dr. Sony Alonso on 1/10/25.  - Patient received the surgery, did well postop well, and was discharged home on the same day.     Dispo: home    Physical Examination:  Vital Signs:  Blood pressure 110/61, pulse 72, temperature 98.2 °F (36.8 °C), temperature source Temporal, resp. rate 16, height 5' 8\" (1.727 m), weight 203 lb 8 oz (92.3 kg), SpO2 95%.     GENERAL:  The patient appeared to  be in no distress.   SKIN:  Warm and hydrated  HEENT:  Head was atraumatic and normocephalic.    NECK:  Supple.  There was no JVD.   HEART:  S1 and S2 heard.  RRR   LUNGS:  Air entry was good.  No crackles or wheezes   ABDOMEN: Soft and non-tender.  Bowel sounds were present.  MUSCULOSKELETAL:  There was no deformity.    EXTREMITIES: There was no edema, clubbing or cyanosis  NEUROLOGICAL:  There was no focal deficit.  Cranial nerves II through XII were intact.  PSYCHIATRIC: Calm and cooperative     CULTURE:   No results found for this visit on 01/09/25.    IMAGING STUDIES:   XR FLUOROSCOPY C-ARM TIME LESS THAN 1 HOUR (CPT=76000)    Result Date: 1/10/2025  CONCLUSION: See above.   elm-remote  Dictated by (CST): Jean Carlos Lambert MD on 1/10/2025 at 1:56 PM     Finalized by (CST): Jean Carlos Lambert MD on 1/10/2025 at 1:56 PM          MRI SPINE CERVICAL (CPT=72141)    Result Date: 1/8/2025  CONCLUSION:  1. Multilevel degenerative disease with cervical lordotic reversal at the C4-C5 level. At C4-C5, there is moderate spinal canal stenosis, indentation of the ventral cord contour, and severe right greater left foraminal narrowing. Subtle increased signal intensity may reflect early, developing myelomalacia.  2. Additional compromise of the neural structures as detailed above.  3. No acute osseous injury of the cervical spine is detected.   4. Partially visualized paranasal sinus disease.  5. Lesser incidental findings as above.    elm-remote.   Dictated by (CST): Aly Lew MD on 1/08/2025 at 8:13 AM     Finalized by (CST): Aly Lew MD on 1/08/2025 at 8:22 AM           LABS :     Lab Results   Component Value Date    WBC 8.4 01/10/2025    HGB 16.2 01/10/2025    HCT 46.2 01/10/2025    .0 01/10/2025    CREATSERUM 1.01 01/10/2025    BUN 16 01/10/2025     01/10/2025    K 4.7 01/10/2025     01/10/2025    CO2 31.0 01/10/2025    GLU 95 01/10/2025    CA 10.1 01/10/2025    ALB 4.9 (H) 12/17/2024     ALKPHO 74 12/17/2024    BILT 0.5 12/17/2024    TP 7.6 12/17/2024    AST 28 12/17/2024    ALT 38 12/17/2024       Recent Labs   Lab 01/09/25  1602 01/10/25  0646   RBC 4.96 4.91   HGB 16.4 16.2   HCT 44.8 46.2   MCV 90.3 94.1   MCH 33.1 33.0   MCHC 36.6 35.1   RDW 12.0 12.3   NEPRELIM 9.41* 5.22   WBC 12.3* 8.4   .0 213.0     Recent Labs   Lab 01/09/25  1602 01/10/25  0646   GLU 96 95   BUN 17 16   CREATSERUM 1.01 1.01   CA 9.9 10.1    141   K 4.0 4.7    104   CO2 28.0 31.0     No results found for: \"PT\", \"INR\"    Discharge Medications:      Discharge Medications        CONTINUE taking these medications        Instructions Prescription details   ALPRAZolam 1 MG Tabs  Commonly known as: Xanax      Take 1 tablet (1 mg total) by mouth 3 (three) times daily as needed.   Refills: 0     cetirizine 10 MG Tabs  Commonly known as: ZyrTEC      Take 1 tablet (10 mg total) by mouth daily.   Refills: 0     cyclobenzaprine 10 MG Tabs  Commonly known as: Flexeril      Take 1 tablet (10 mg total) by mouth 3 (three) times daily.   Refills: 0            STOP taking these medications      amphetamine-dextroamphetamine 30 MG Tabs  Commonly known as: ADDERALL                 Follow up Visits  Sony Alonso MD  [address] [facility] [address]  [phone]    Schedule an appointment as soon as possible for a visit in 2 week(s)  Follow-up      Consultants         Provider   Role Specialty     Christ Sandy MD      Consulting Physician SURGERY, ORTHOPEDIC            ----------------------------------------------------  35 MIN SPENT ON THIS DISCHARGE   1/10/2025  4:05 PM    Sherman Jaime MD, PhD  [facility]

## 2025-01-10 NOTE — H&P
[facility]  part of [facility]    History & Physical    Cesar Llanos Patient Status:  Inpatient    1995 MRN [id number]   Location [facility] PRE OP RECOVERY Attending Sherman Jaime MD   Hosp Day # 1 PCP Lea Dorantes,      Date:  1/10/2025  Date of Admission:  2025    History:  Past Medical History:    Anxiety    Back pain    Back problem    CP (cerebral palsy) (HCC)    ANA (generalized anxiety disorder)    Neuropathy    PONV (postoperative nausea and vomiting)     Past Surgical History:   Procedure Laterality Date    Adenoidectomy      Appendectomy      Spine surgery procedure unlisted  2024    lumbar     Family History   Problem Relation Age of Onset    Diabetes Mother       reports that he has never smoked. He has never used smokeless tobacco. He reports current alcohol use. He reports that he does not use drugs.    Allergies:  Allergies[1]    Home Medications:  Prescriptions Prior to Admission[2]    Review of Systems:  12 point ROS reviewed without pertinent positives.     HPI: The patient presents with complaints of neck pain with radiculopathy to the right upper arm and shoulder due to a large C4-5 HNP with subsequent stenosis.  There is no change since the H&P performed by our fellow yesterday afternoon.    Physical Exam:  The patient is well developed, no acute distress, alert and oriented x3, skin intact to the anterior cervical without erythema or edema, motor exam with guarded right upper extremity findings/weakness due to pain worse with attempt at deltoid testing. 4+/5 on the left upper extremity.       Assessment:  Patient Active Problem List   Diagnosis    Allergic rhinitis due to other allergen    Other chronic sinusitis    Hypertrophy of adenoids alone    DNS (deviated nasal septum)    Allergic rhinitis due to pollen    Cervical myelopathy (HCC)     Cervical stenosis, myeloradiculopathy    Plan:  C4-5 ACDF      Ivette Shields,  JENNIFER  1/10/2025  8:06 AM        [1]   Allergies  Allergen Reactions    Seasonal ITCHING     Cockroach, trees, grass, dust mites   [2]   Medications Prior to Admission   Medication Sig Dispense Refill Last Dose/Taking    cetirizine 10 MG Oral Tab Take 1 tablet (10 mg total) by mouth daily.   1/9/2025 at  6:30 AM    cyclobenzaprine 10 MG Oral Tab Take 1 tablet (10 mg total) by mouth 3 (three) times daily.   1/9/2025 at  2:00 PM    amphetamine-dextroamphetamine 30 MG Oral Tab Take 1 tablet (30 mg total) by mouth 3 (three) times daily as needed.   1/9/2025 at  1:00 PM    ALPRAZolam 1 MG Oral Tab Take 1 tablet (1 mg total) by mouth 3 (three) times daily as needed.   1/9/2025 at  3:00 PM

## 2025-01-16 NOTE — OPERATIVE REPORT
PATIENT  Cesar Llanos    DATE  1/10/2025    SURGEON  Sony Alonso MD     ASSISTANT  RUTHY Skelton     PREOPERATIVE DIAGNOSIS  Cervical myeloradiculopathy  C4-5 HNP with spinal stenosis   Cerebral palsy    POSTOPERATIVE DIAGNOSIS    Same      PROCEDURE   1. Anterior cervical diskectomy-foraminotomy and fusion, C4-5  2. Application of biomechanical interbody cage, C4-5  3. Anterior cervical spinal plate instrumentation, C4-5  4. Application of local bone autograft and allograft (1 mL nanOss).   5. Intraoperative neuromonitoring (somatosensory evoked potentials).   6. Intraoperative microscope.   7. Intraoperative fluoroscopy.      DESCRIPTION OF PROCEDURE     ANESTHESIA  General endotrachial anesthesia     ESTIMATED BLOOD LOSS  Less than 25 ml     URINE OUTPUT   n/a    DRAINS  Medium hemovac        IMPLANTS  Nuvasive anterior cervical plate  Nuvasive cohere interbody    COMPLICATIONS   None.     INDICATIONS  The patient is a 29 year old male, with a persistant cervical radiculopathy and neck pain despite physical therapy, injection therapy, and medical treatment. Risks and benefits of anterior spinal fusion surgery were explained to the patient and family/guardian in great detail. Risks included infection, nerve damage, blood loss requiring transfusion, reoperation for any reason, and the general risks of anesthesia. A consent was signed.      PROCEDURE  The patient was met in the preoperative area. The neck was marked on the operative site. RAMON hose stockings were placed on the bilateral lower extremities. They was transferred to the operating room where general anesthesia was administered via endotracheal intubation. They were given antibiotic medication for infectious prophylaxis. Neuromonitoring leads were placed on the extremities. SCDs were placed on the bilateral lower extremities and turned on for DVT prophylaxis.     The patient was repositioned supine on the operative table without a bump. Arms were  draped at the sides with arm cradles, padding the elbows. All other bony prominences were thoroughly padded. The shoulders were depressed using a small amount of traction by taping the shoulders down to the bed. We obtained a lateral fluoroscopic view to ariana our start site and confirm adequate bony visualization. The neck was then prepped and draped in the usual sterile fashion.      A timeout was performed in conjunction with WHO guidelines, and everyone in the room was in agreement with regard to the patient, procedure, surgical site, instrumentation and antibiotics.     We began by making a transverse incision in line with one of the patient's neck crease. We sharply incised through the skin down to the level of the platysma muscle. The platysma was sharply defined with a knife. We undermined the skin slightly above the platysma in order to create extra mobility. We then divided the platysma with Bovie electrocautery. Metzenbaum scissors were used to spread under the platysma, and it was further divided in a transverse fashion. We then undermined the platysma in both the cephalad and caudal directions. We next developed the interval between the strap muscles and the sternocleidomastoid and proceeded with the standard approach down to the anterior cervical spine. The omohyoid was retracted inferiorly and medially. The plane between the midline structures and SCM muscle was gently opened with scissors dissection. Care was taken to maintain hemostasis at every step using retraction and electrocautery.     The cervical spine was identified, and the prevertebral fascia was divided using the Metzenbaum scissors. Peanut retractors were then used to spread and open the fascia from a medial to lateral direction. A spinal needle was placed in the presumed C4 body, and its position was confirmed on lateral fluoroscopy. We then completely exposed the C4-5 disk. A Shadow-Line retractor was then placed under the longus colli.  [place] pins were placed in C4 and C5 body. For the superior body, the caspar pin was removed and bone marrow aspirate was removed.  We again used fluoroscopy to confirm that we were at the correct level.     An annulotomy was performed using a 15 blade Under the microscope, diskectomy and endplate preparation was performed with a series of pituitary rongeurs, Kerrison rongeurs and curved and straight curettes. We then scraped the endplates with the curette. The anteroinferior ridge at C4 was removed with a #3 Kerrison punch after distracting through the [place] pins. We then used a bur to remove 1 to 2 mm of the sclerotic endplate from the superior aspect of the superior body. Curved curettes were used to pass around the posterior vertebral body. The disk herniation was identified and removed in its entirety. The posterior longitudinal ligament was identified and resected with a #1 Kerrison. Foraminotomies were performed bilaterally as we had dissected out to the level of the uncovertebral joints and then undercut posteriorly. After completing our diskectomy and decompression at this level, we irrigated, then achieved hemostasis with FloSeal and a griffin. We then sized and placed our cage implant. Following this we placed our final cage for this level. We then removed the caspar pins and placed bone wax in the hole.     Following this we measured for our final anterior cervical plate. The plate was separate from the interbody. Screws were placed in a medial fashion through the plate.  We then confirmed the proper location of the plate with flouroscopy.     Final AP and lateral fluoroscopic views were obtained, confirming appropriate position of the plate.      Hemostatsis was again confirmed prior to beginning closure. A hemovac drain was placed deep, overlying the vertebral bodies. We then began our closure. Closure was performed with 3-0 Vicryl to close the platysmal layer and the deep dermal layer. Skin was closed  with a running 4-0 monocryl for the subcuticular layer.     All sponge and needle counts were correct prior to closure. There were no changes in our baseline SSEP neuromonitoring signals throughout the case.      I was present and performed all critical aspects of this case.

## (undated) DIAGNOSIS — G47.00 INSOMNIA: Primary | ICD-10-CM

## (undated) DIAGNOSIS — R06.81 APNEA: ICD-10-CM

## (undated) DEVICE — BIT DRL 11MM TI ALLY FOR ACP SPNL PLT SYS

## (undated) DEVICE — 3M™ TEGADERM™ HP TRANSPARENT FILM DRESSING FRAME STYLE, 9534HP, 2-3/8 X 2-3/4 IN (6 CM X 7 CM), 100/CT 4CT/CASE: Brand: 3M™ TEGADERM™

## (undated) DEVICE — INSULATED BLADE ELECTRODE: Brand: EDGE

## (undated) DEVICE — KIT HEMSTAT MTRX 8ML PORCINE GEL HUM THROM

## (undated) DEVICE — ADHESIVE SKIN TOP FOR WND CLSR DERMBND ADV

## (undated) DEVICE — DRAPE,THYROID,SOFT,STERILE: Brand: MEDLINE

## (undated) DEVICE — FORCEP CUSH BAY BP DISP 7.5IN

## (undated) DEVICE — CERVICAL CDS: Brand: MEDLINE INDUSTRIES, INC.

## (undated) DEVICE — GLOVE SUR 6.5 SENSICARE PI MIC PIP CRM PWD F

## (undated) DEVICE — MAXCESS C MODULE

## (undated) DEVICE — SPONGE 4X4 10PK

## (undated) DEVICE — GOWN,SIRUS,FABRIC-REINFORCED,X-LARGE: Brand: MEDLINE

## (undated) DEVICE — GAUZE,SPONGE,4"X4",16PLY,XRAY,STRL,LF: Brand: MEDLINE

## (undated) DEVICE — SOLUTION IRRIG 1000ML 0.9% NACL USP BTL

## (undated) DEVICE — MONITORING NEUROPHYSIOLOGICAL

## (undated) DEVICE — C-ARMOR C-ARM EQUIPMENT COVERS CLEAR STERILE UNIVERSAL FIT 12 PER CASE: Brand: C-ARMOR

## (undated) DEVICE — GLOVE SUR 7 SENSICARE PI PIP GRN PWD F

## (undated) DEVICE — 3.0MM PRECISION NEURO (MATCH HEAD)

## (undated) DEVICE — KIT EVAC 400CC DIA1/8IN H PAT 12.5IN 3 SPR

## (undated) DEVICE — NON-ADHERENT DRESSING: Brand: TELFA

## (undated) DEVICE — SUT MCRYL 3-0 18IN PS-2 ABSRB UD 19MM 3/8 CIR

## (undated) DEVICE — INTENDED USE FOR SURGICAL MARKING ON INTACT SKIN, ALSO PROVIDES A PERMANENT METHOD OF IDENTIFYING OBJECTS IN THE OPERATING ROOM: Brand: WRITESITE® PLUS MINI PREP RESISTANT MARKER

## (undated) DEVICE — GLOVE SUR 6.5 SENSICARE PI PIP GRN PWD F

## (undated) DEVICE — GOWN,SIRUS,FABRIC-REINFORCED,LARGE: Brand: MEDLINE

## (undated) DEVICE — HANDLE SUR BLU PLAS LT FLX SLIP ON ST DISP

## (undated) DEVICE — GLOVE SUR 7 SENSICARE PI MIC PIP CRM PWD F

## (undated) DEVICE — ANTIBACTERIAL UNDYED BRAIDED (POLYGLACTIN 910), SYNTHETIC ABSORBABLE SUTURE: Brand: COATED VICRYL

## (undated) DEVICE — GLOVE SUR 8.5 SENSICARE PI MIC PIP CRM PWD F

## (undated) DEVICE — SHEET,DRAPE,53X77,STERILE: Brand: MEDLINE

## (undated) NOTE — LETTER
Hospital Discharge Documentation    From: [facility] Hospitalist's Office  Phone: [phone]    Patient discharged time/date: 1/10/2025  3:48 PM  Patient discharge disposition:  Home or Self Care       Discharge Summary - D/C Summary        Discharge Summary signed by Sherman Jaime MD at 1/10/2025  4:06 PM  Version 1 of 1      Author: Sherman Jaime MD Service: Hospitalist Author Type: Physician    Filed: 1/10/2025  4:06 PM Date of Service: 1/10/2025  4:05 PM Status: Signed    : Sherman Jaime MD (Physician)         [facility]  part of [facility]    Discharge Summary    Cesar Llanos Patient Status:  Inpatient    1995 MRN [id number]   Location [facility] Attending No att. providers found   Hosp Day # 1 PCP Lea Dorantes DO     Date of Admission: 2025     Date of Discharge: 01/10/25     Hospital Discharge Diagnoses:  Cervical spinal stenosis with radiculopathy and myelopathy     Lace+ Score: 28  59-90 High Risk  29-58 Medium Risk  0-28   Low Risk.    TCM Follow-Up Recommendation:  LACE < 29: Low Risk of readmission after discharge from the hospital. No TCM follow-up needed.    HPI Per Admitting Physician: Patient is a 29-year-old  male with chronic and progressive right-sided neck pain radiating to his right upper extremity. He had an MRI scan of the cervical spine done on , which showed multilevel degenerative joint disease of the cervical spine with lordotic reversal at C4-C5 level. There is moderate spinal canal stenosis and indentation of the ventral cord's contour with severe right greater than left foraminal narrowing. Patient came in today to the emergency department for evaluation. He is scheduled tomorrow for anterior cervical discectomy and fusion by Dr. Sony Alonso.      Hospital Course:      # Cervical spinal stenosis with radiculopathy and myelopathy   - MRI 25 shoed moderate spinal canal stenosis at C4-C5, indentation of the  ventral cord contour, and severe right greater left foraminal narrowing.   - He is scheduled or anterior cervical discectomy and fusion by Dr. Sony Alonso on 1/10/25.  - Patient received the surgery, did well postop well, and was discharged home on the same day.     Dispo: home    Physical Examination:  Vital Signs:  Blood pressure 110/61, pulse 72, temperature 98.2 °F (36.8 °C), temperature source Temporal, resp. rate 16, height 5' 8\" (1.727 m), weight 203 lb 8 oz (92.3 kg), SpO2 95%.     GENERAL:  The patient appeared to be in no distress.   SKIN:  Warm and hydrated  HEENT:  Head was atraumatic and normocephalic.    NECK:  Supple.  There was no JVD.   HEART:  S1 and S2 heard.  RRR   LUNGS:  Air entry was good.  No crackles or wheezes   ABDOMEN: Soft and non-tender.  Bowel sounds were present.  MUSCULOSKELETAL:  There was no deformity.    EXTREMITIES: There was no edema, clubbing or cyanosis  NEUROLOGICAL:  There was no focal deficit.  Cranial nerves II through XII were intact.  PSYCHIATRIC: Calm and cooperative     CULTURE:   No results found for this visit on 01/09/25.    IMAGING STUDIES:   XR FLUOROSCOPY C-ARM TIME LESS THAN 1 HOUR (CPT=76000)    Result Date: 1/10/2025  CONCLUSION: See above.   elm-remote  Dictated by (CST): Jean Carlos Lambert MD on 1/10/2025 at 1:56 PM     Finalized by (CST): Jean Carlos Lambert MD on 1/10/2025 at 1:56 PM          MRI SPINE CERVICAL (CPT=72141)    Result Date: 1/8/2025  CONCLUSION:  1. Multilevel degenerative disease with cervical lordotic reversal at the C4-C5 level. At C4-C5, there is moderate spinal canal stenosis, indentation of the ventral cord contour, and severe right greater left foraminal narrowing. Subtle increased signal intensity may reflect early, developing myelomalacia.  2. Additional compromise of the neural structures as detailed above.  3. No acute osseous injury of the cervical spine is detected.   4. Partially visualized paranasal sinus disease.  5. Lesser  incidental findings as above.    elm-remote.   Dictated by (CST): Aly Lew MD on 1/08/2025 at 8:13 AM     Finalized by (CST): Aly Lew MD on 1/08/2025 at 8:22 AM           LABS :     Lab Results   Component Value Date    WBC 8.4 01/10/2025    HGB 16.2 01/10/2025    HCT 46.2 01/10/2025    .0 01/10/2025    CREATSERUM 1.01 01/10/2025    BUN 16 01/10/2025     01/10/2025    K 4.7 01/10/2025     01/10/2025    CO2 31.0 01/10/2025    GLU 95 01/10/2025    CA 10.1 01/10/2025    ALB 4.9 (H) 12/17/2024    ALKPHO 74 12/17/2024    BILT 0.5 12/17/2024    TP 7.6 12/17/2024    AST 28 12/17/2024    ALT 38 12/17/2024       Recent Labs   Lab 01/09/25  1602 01/10/25  0646   RBC 4.96 4.91   HGB 16.4 16.2   HCT 44.8 46.2   MCV 90.3 94.1   MCH 33.1 33.0   MCHC 36.6 35.1   RDW 12.0 12.3   NEPRELIM 9.41* 5.22   WBC 12.3* 8.4   .0 213.0     Recent Labs   Lab 01/09/25  1602 01/10/25  0646   GLU 96 95   BUN 17 16   CREATSERUM 1.01 1.01   CA 9.9 10.1    141   K 4.0 4.7    104   CO2 28.0 31.0     No results found for: \"PT\", \"INR\"    Discharge Medications:      Discharge Medications        CONTINUE taking these medications        Instructions Prescription details   ALPRAZolam 1 MG Tabs  Commonly known as: Xanax      Take 1 tablet (1 mg total) by mouth 3 (three) times daily as needed.   Refills: 0     cetirizine 10 MG Tabs  Commonly known as: ZyrTEC      Take 1 tablet (10 mg total) by mouth daily.   Refills: 0     cyclobenzaprine 10 MG Tabs  Commonly known as: Flexeril      Take 1 tablet (10 mg total) by mouth 3 (three) times daily.   Refills: 0            STOP taking these medications      amphetamine-dextroamphetamine 30 MG Tabs  Commonly known as: ADDERALL                 Follow up Visits  Sony Alonso MD  [address] [facility] [address]  [phone]    Schedule an appointment as soon as possible for a visit in 2 week(s)  Follow-up      Consultants         Provider   Role Specialty      Christ Sandy MD      Consulting Physician SURGERY, ORTHOPEDIC            ----------------------------------------------------  35 MIN SPENT ON THIS DISCHARGE   1/10/2025  4:05 PM    Sherman Jaime MD, PhD  [facility] Hospitalist    Electronically signed by Sherman Jaime MD on 1/10/2025  4:06 PM

## (undated) NOTE — LETTER
Date & Time: 11/10/2024, 4:32 PM  Patient: Cesar Llanos  Encounter Provider(s):    Gabriel Pedraza MD       To Whom It May Concern:    Cesar Llanos was seen and treated in our department on 11/10/2024. He may need to work from home for the next 2-3 days.    If you have any questions or concerns, please do not hesitate to call.       JOSEFINA Pedraza MD  _____________________________  Physician/APC Signature

## (undated) NOTE — LETTER
[facility]  Administration of Anesthesia  I, Cesar Llanos agree to be cared for by a physician anesthesiologist alone and/or with a nurse anesthetist, who is specially trained to monitor me and give me medicine to put me to sleep or keep me comfortable during my procedure    I understand that my anesthesiologist and/or anesthetist is not an employee or agent of [facility] or [employer]. He or she works for [facility]    As the patient asking for anesthesia services, I agree to:  Allow the anesthesiologist (anesthesia doctor) to give me medicine and do additional procedures as necessary. Some examples are: Starting or using an “IV” to give me medicine, fluids or blood during my procedure, and having a breathing tube placed to help me breathe when I’m asleep (intubation). In the event that my heart stops working properly, I understand that my anesthesiologist will make every effort to sustain my life, unless otherwise directed by [facility] Do Not Resuscitate documents.  Tell my anesthesia doctor before my procedure:  If I am pregnant.  The last time that I ate or drank.  iii. All of the medicines I take (including prescriptions, herbal supplements, and pills I can buy without a prescription (including street drugs/illegal medications). Failure to inform my anesthesiologist about these medicines may increase my risk of anesthetic complications.  iv.If I am allergic to anything or have had a reaction to anesthesia before.  I understand how the anesthesia medicine will help me (benefits).  I understand that with any type of anesthesia medicine there are risks:  The most common risks are: nausea, vomiting, sore throat, muscle soreness, damage to my eyes, mouth, or teeth (from breathing tube placement).  Rare risks include: remembering what happened during my procedure, allergic reactions to medications, injury to my airway, heart, lungs, vision, nerves, or  muscles and in extremely rare instances death.  My doctor has explained to me other choices available to me for my care (alternatives).  Pregnant Patients (“epidural”):  I understand that the risks of having an epidural (medicine given into my back to help control pain during labor), include itching, low blood pressure, difficulty urinating, headache or slowing of the baby’s heart. Very rare risks include infection, bleeding, seizure, irregular heart rhythms and nerve injury.  Regional Anesthesia (“spinal”, “epidural”, & “nerve blocks”):  I understand that rare but potential complications include headache, bleeding, infection, seizure, irregular heart rhythms, and nerve injury.    _____________________________________________________________________________  Patient (or Representative) Signature/Relationship to Patient  Date   Time    _____________________________________________________________________________   Name (if used)    Language/Organization   Time    _____________________________________________________________________________  Nurse Anesthetist Signature     Date   Time  _____________________________________________________________________________  Anesthesiologist Signature     Date   Time  I have discussed the procedure and information above with the patient (or patient’s representative) and answered their questions. The patient or their representative has agreed to have anesthesia services.    _____________________________________________________________________________  Witness        Date   Time  I have verified that the signature is that of the patient or patient’s representative, and that it was signed before the procedure  Patient Name: Cesar Llanos     : 1995                 Printed: 2025 at 5:44 PM    Medical Record #: [id number]                                            Page 1 of 1  ----------ANESTHESIA CONSENT----------

## (undated) NOTE — LETTER
[facility]  [address], [place], [place]    Authorization for Surgical Operation and Procedure                               I hereby authorize Sony Alonso MD, my physician and his/her assistants (if applicable), which may include medical students, residents, and/or fellows, to perform the following surgical operation/ procedure and administer such anesthesia as may be determined necessary by my physician: Operation/Procedure name (s) [id number] ANTERIOR CERVICAL DISCECTOMY FUSION on Cesar Llanos   2.   I recognize that during the surgical operation/procedure, unforeseen conditions may necessitate additional or different procedures than those listed above.  I, therefore, further authorize and request that the above-named surgeon, assistants, or designees perform such procedures as are, in their judgment, necessary and desirable.    3.   My surgeon/physician has discussed prior to my surgery the potential benefits, risks and side effects of this procedure; the likelihood of achieving goals; and potential problems that might occur during recuperation.  They also discussed reasonable alternatives to the procedure, including risks, benefits, and side effects related to the alternatives and risks related to not receiving this procedure.  I have had all my questions answered and I acknowledge that no guarantee has been made as to the result that may be obtained.    4.   Should the need arise during my operation/procedure, which includes change of level of care prior to discharge, I also consent to the administration of blood and/or blood products.  Further, I understand that despite careful testing and screening of blood or blood products by collecting agencies, I may still be subject to ill effects as a result of receiving a blood transfusion and/or blood products.  The following are some, but not all, of the potential risks that can occur: fever and allergic reactions, hemolytic reactions,  transmission of diseases such as Hepatitis, AIDS and Cytomegalovirus (CMV) and fluid overload.  In the event that I wish to have an autologous transfusion of my own blood, or a directed donor transfusion, I will discuss this with my physician.  Check only if Refusing Blood or Blood Products  I understand refusal of blood or blood products as deemed necessary by my physician may have serious consequences to my condition to include possible death. I hereby assume responsibility for my refusal and release the hospital, its personnel, and my physicians from any responsibility for the consequences of my refusal.    o  Refuse   5.   I authorize the use of any specimen, organs, tissues, body parts or foreign objects that may be removed from my body during the operation/procedure for diagnosis, research or teaching purposes and their subsequent disposal by hospital authorities.  I also authorize the release of specimen test results and/or written reports to my treating physician on the hospital medical staff or other referring or consulting physicians involved in my care, at the discretion of the Pathologist or my treating physician.    6.   I consent to the photographing or videotaping of the operations or procedures to be performed, including appropriate portions of my body for medical, scientific, or educational purposes, provided my identity is not revealed by the pictures or by descriptive texts accompanying them.  If the procedure has been photographed/videotaped, the surgeon will obtain the original picture, image, videotape or CD.  The hospital will not be responsible for storage, release or maintenance of the picture, image, tape or CD.    7.   I consent to the presence of a  or observers in the operating room as deemed necessary by my physician or their designees.    8.   I recognize that in the event my procedure results in extended X-Ray/fluoroscopy time, I may develop a skin reaction.    9. If  I have a Do Not Attempt Resuscitation (DNAR) order in place, that status will be suspended while in the operating room, procedural suite, and during the recovery period unless otherwise explicitly stated by me (or a person authorized to consent on my behalf). The surgeon or my attending physician will determine when the applicable recovery period ends for purposes of reinstating the DNAR order.  10. Patients having a sterilization procedure: I understand that if the procedure is successful the results will be permanent and it will therefore be impossible for me to inseminate, conceive, or bear children.  I also understand that the procedure is intended to result in sterility, although the result has not been guaranteed.   11. I acknowledge that my physician has explained sedation/analgesia administration to me including the risk and benefits I consent to the administration of sedation/analgesia as may be necessary or desirable in the judgment of my physician.    I CERTIFY THAT I HAVE READ AND FULLY UNDERSTAND THE ABOVE CONSENT TO OPERATION and/or OTHER PROCEDURE.     ____________________________________  _________________________________        ______________________________  Signature of Patient    Signature of Responsible Person                Printed Name of Responsible Person                                      ____________________________________  _____________________________                ________________________________  Signature of Witness        Date  Time         Relationship to Patient    STATEMENT OF PHYSICIAN My signature below affirms that prior to the time of the procedure; I have explained to the patient and/or his/her legal representative, the risks and benefits involved in the proposed treatment and any reasonable alternative to the proposed treatment. I have also explained the risks and benefits involved in refusal of the proposed treatment and alternatives to the proposed treatment and have  answered the patient's questions. If I have a significant financial interest in a co-management agreement or a significant financial interest in any product or implant, or other significant relationship used in this procedure/surgery, I have disclosed this and had a discussion with my patient.     _____________________________________________________              _____________________________  (Signature of Physician)                                                                                         (Date)                                   (Time)  Patient Name: Cesar PEREA Romi      : 1995      Printed: 2025     Medical Record #: [id number]                                      Page 1 of 1